# Patient Record
Sex: FEMALE | Race: BLACK OR AFRICAN AMERICAN | Employment: UNEMPLOYED | URBAN - METROPOLITAN AREA
[De-identification: names, ages, dates, MRNs, and addresses within clinical notes are randomized per-mention and may not be internally consistent; named-entity substitution may affect disease eponyms.]

---

## 2021-12-04 ENCOUNTER — OFFICE VISIT (OUTPATIENT)
Dept: URGENT CARE | Age: 56
End: 2021-12-04
Payer: MEDICAID

## 2021-12-04 VITALS — OXYGEN SATURATION: 95 % | HEART RATE: 93 BPM | RESPIRATION RATE: 18 BRPM | TEMPERATURE: 98.3 F

## 2021-12-04 DIAGNOSIS — R09.81 NASAL CONGESTION: ICD-10-CM

## 2021-12-04 DIAGNOSIS — J02.9 SORE THROAT: Primary | ICD-10-CM

## 2021-12-04 DIAGNOSIS — R05.9 COUGH: ICD-10-CM

## 2021-12-04 DIAGNOSIS — R68.83 CHILLS: ICD-10-CM

## 2021-12-04 LAB
FLUAV+FLUBV AG NOSE QL IA.RAPID: NEGATIVE
FLUAV+FLUBV AG NOSE QL IA.RAPID: NEGATIVE
S PYO AG THROAT QL: NEGATIVE
SARS-COV-2 POC: NEGATIVE
VALID INTERNAL CONTROL?: YES
VALID INTERNAL CONTROL?: YES

## 2021-12-04 PROCEDURE — 87880 STREP A ASSAY W/OPTIC: CPT | Performed by: INTERNAL MEDICINE

## 2021-12-04 PROCEDURE — 87426 SARSCOV CORONAVIRUS AG IA: CPT | Performed by: INTERNAL MEDICINE

## 2021-12-04 PROCEDURE — 99203 OFFICE O/P NEW LOW 30 MIN: CPT | Performed by: INTERNAL MEDICINE

## 2021-12-04 PROCEDURE — 87804 INFLUENZA ASSAY W/OPTIC: CPT | Performed by: INTERNAL MEDICINE

## 2021-12-04 RX ORDER — DIPHENHYDRAMINE HCL 25 MG
CAPSULE ORAL
Qty: 30 TABLET | Refills: 0 | Status: SHIPPED | OUTPATIENT
Start: 2021-12-04

## 2021-12-04 RX ORDER — PREDNISONE 20 MG/1
40 TABLET ORAL DAILY
Qty: 6 TABLET | Refills: 0 | Status: SHIPPED | OUTPATIENT
Start: 2021-12-04

## 2021-12-04 RX ORDER — IBUPROFEN 600 MG/1
600 TABLET ORAL
Qty: 20 TABLET | Refills: 0 | Status: SHIPPED | OUTPATIENT
Start: 2021-12-04

## 2021-12-04 RX ORDER — PROMETHAZINE HYDROCHLORIDE AND DEXTROMETHORPHAN HYDROBROMIDE 6.25; 15 MG/5ML; MG/5ML
5 SYRUP ORAL
Qty: 60 ML | Refills: 0 | Status: SHIPPED | OUTPATIENT
Start: 2021-12-04

## 2021-12-04 RX ORDER — LORATADINE 10 MG/1
10 TABLET ORAL DAILY
Qty: 15 TABLET | Refills: 0 | Status: SHIPPED | OUTPATIENT
Start: 2021-12-04

## 2021-12-04 NOTE — LETTER
December 4, 2021  [address] [facility] [address]    Dear Asad Edouard: Thank you for requesting access to Thumbs Up. Please follow the instructions below to securely access and download your online medical record. Thumbs Up allows you to send messages to your doctor, view your test results, renew your prescriptions, schedule appointments, and more. How Do I Sign Up? 1. In your internet browser, go to [email or web address]. 2. Click on the First Time User? Click Here link in the Sign In box. You will see the New Member Sign Up page. 3. Enter your Thumbs Up Access Code exactly as it appears below. You will not need to use this code after youve completed the sign-up process. If you do not sign up before the expiration date, you must request a new code. Thumbs Up Access Code: [email or web address]  Expires: 1/18/2022 11:06 AM     4. Enter the last four digits of your Social Security Number (xxxx) and Date of Birth (mm/dd/yyyy) as indicated and click Submit. You will be taken to the next sign-up page. 5. Create a Thumbs Up ID. This will be your Thumbs Up login ID and cannot be changed, so think of one that is secure and easy to remember. 6. Create a Thumbs Up password. You can change your password at any time. 7. Enter your Password Reset Question and Answer. This can be used at a later time if you forget your password. 8. Enter your e-mail address. You will receive e-mail notification when new information is available in [address]. 9. Click Sign Up. You can now view and download portions of your medical record. 10. Click the Download Summary menu link to download a portable copy of your medical information. Additional Information    If you have questions, please visit the Frequently Asked Questions section of the Thumbs Up website at [email or web address] Remember, Thumbs Up is NOT to be used for urgent needs. For medical emergencies, dial 911.     Now available from your iPhone and Android!     Sincerely,   [employer]

## 2021-12-04 NOTE — PROGRESS NOTES
HISTORY OF PRESENT ILLNESS  Nicki Kohler is a 61 y.o. female. Pt presents today concerned for COVID or FLU infection due to starting to feel bad on Wednesday after working a shift on Monday 11/29. Having congestion, ST, malaise, cough, and HA. Denies fever, CP, SOB, loss of sense of taste and smell, & diarrhea. Sent to get documentation by work (Liliana Rinaldi). There is no problem list on file for this patient. There are no problems to display for this patient. Current Outpatient Medications   Medication Sig Dispense Refill    acetaminophen (TYLENOL PO) Take  by mouth.  ascorbic acid/multivit-min (EMERGEN-C PO) Take  by mouth. No Known Allergies  History reviewed. No pertinent past medical history. History reviewed. No pertinent surgical history. History reviewed. No pertinent family history. Social History     Tobacco Use    Smoking status: Current Every Day Smoker    Smokeless tobacco: Never Used   Substance Use Topics    Alcohol use: Not Currently        Review of Systems   Constitutional: Positive for fever and malaise/fatigue. HENT: Positive for congestion and sore throat. Negative for sinus pain. Respiratory: Positive for cough. Negative for sputum production, shortness of breath and wheezing. Cardiovascular: Negative for chest pain. Gastrointestinal: Negative for abdominal pain, nausea and vomiting. Musculoskeletal: Positive for myalgias. Neurological: Positive for headaches. Negative for dizziness. All other systems reviewed and are negative. Physical Exam  Vitals and nursing note reviewed. Constitutional:       General: She is not in acute distress. Appearance: She is well-developed. She is ill-appearing. She is not diaphoretic. HENT:      Head: Normocephalic and atraumatic. Right Ear: External ear normal.      Left Ear: External ear normal.      Mouth/Throat:      Pharynx: Posterior oropharyngeal erythema present.       Tonsils: Tonsillar exudate present. 1+ on the right. 1+ on the left. Cardiovascular:      Rate and Rhythm: Normal rate. Pulmonary:      Effort: Pulmonary effort is normal. No respiratory distress. Breath sounds: Normal breath sounds. No decreased breath sounds, wheezing or rhonchi. Abdominal:      General: There is no distension. Neurological:      Mental Status: She is alert and oriented to person, place, and time. Psychiatric:         Behavior: Behavior normal.         Judgment: Judgment normal.         ASSESSMENT and PLAN  CLINICAL IMPRESSION:     ICD-10-CM ICD-9-CM    1. Sore throat  J02.9 462 AMB POC SARS-COV-2      AMB POC JA INFLUENZA A/B TEST      AMB POC RAPID STREP A      UPPER RESPIRATORY CULTURE      UPPER RESPIRATORY CULTURE   2. Chills  R68.83 780.64 AMB POC SARS-COV-2      AMB POC JA INFLUENZA A/B TEST      AMB POC RAPID STREP A   3. Nasal congestion  R09.81 478.19 AMB POC SARS-COV-2      AMB POC JA INFLUENZA A/B TEST   4. Cough  R05.9 786.2 AMB POC SARS-COV-2      AMB POC JA INFLUENZA A/B TEST      AMB POC RAPID STREP A         Plan:  F/U with PCP, INI or symptoms worsen. May report to ER for SOB or CP. Advised to self-isolate for 10 days following potential exposure and at least 24 hours following fever. Symptomatic treatment advised otherwise with use of OTC/Rx meds. Results for orders placed or performed in visit on 12/04/21   AMB POC SARS-COV-2   Result Value Ref Range    SARS-COV-2 POC Negative Negative   AMB POC JA INFLUENZA A/B TEST   Result Value Ref Range    VALID INTERNAL CONTROL POC Yes     Influenza A Ag POC Negative Negative    Influenza B Ag POC Negative Negative   AMB POC RAPID STREP A   Result Value Ref Range    VALID INTERNAL CONTROL POC Yes     Group A Strep Ag Negative Negative             The patients condition was discussed with the patient and they understand. The patient is to follow up with PCP.  If signs and symptoms become worse the pt is to go to the ER. The patient is to take medications as prescribed.

## 2021-12-04 NOTE — PROGRESS NOTES
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     She was examined again  No pharyngeal erythema/ exudate seen  Viral nature of sxs explained   Rx sent and advised to use oral steroid if pain not better    Patient was reassured that she would feel better with Rx given and OTC rx -   If she cant go back to work Monday due to her worsening of her ss- she need to be seen again for work note extension

## 2021-12-04 NOTE — LETTER
NOTIFICATION RETURN TO WORK / SCHOOL    12/4/2021 1:32 PM    Ms. Nicki Kohler  [address] [id number]      To Whom It May Concern:    Nicki Kohler is currently under the care of [address]. She will return to work/school on: 12/6/21, FLU, STREP, & COVID Negative, tested TODAY (12/4/21). If there are questions or concerns please have the patient contact our office.         Sincerely,      E PROVIDER

## 2021-12-04 NOTE — PATIENT INSTRUCTIONS
Follow Up with PCP in 3-5 days if no improvement/routine care. Call to be seen sooner or return Here/ER, if no improvement with treatments advised/prescribed or symptoms/pain worsen (develop fever, pain worsens significantly, or develop NEW symptoms). May use  Benadryl, Phenylephrine, or Chlor-Trimetron for continued cough and sore throat. Claritin, allegra, Xyzal, or Zyrtec may also help. Also try Breathe-Rite (or similar) nose strips for nasal congestion and difficulty sleeping. Also try SEA FLEMING (gel). Try 2 teaspoons of 100% pure honey at bedtime to help with nighttime coughing/sore throat. Vitamin C (7521-7325 mg) may also be helpful for your symptoms. *Frequent handwashing*, rest, and plenty of fluids are most important. Upper Respiratory Infection (Cold): Care Instructions  Your Care Instructions     An upper respiratory infection, or URI, is an infection of the nose, sinuses, or throat. URIs are spread by coughs, sneezes, and direct contact. The common cold is the most frequent kind of URI. The flu and sinus infections are other kinds of URIs. Almost all URIs are caused by viruses. Antibiotics won't cure them. But you can treat most infections with home care. This may include drinking lots of fluids and taking over-the-counter pain medicine. You will probably feel better in 4 to 10 days. The doctor has checked you carefully, but problems can develop later. If you notice any problems or new symptoms, get medical treatment right away. Follow-up care is a key part of your treatment and safety. Be sure to make and go to all appointments, and call your doctor if you are having problems. It's also a good idea to know your test results and keep a list of the medicines you take. How can you care for yourself at home? · To prevent dehydration, drink plenty of fluids. Choose water and other clear liquids until you feel better.  If you have kidney, heart, or liver disease and have to limit fluids, talk with your doctor before you increase the amount of fluids you drink. · Take an over-the-counter pain medicine, such as acetaminophen (Tylenol), ibuprofen (Advil, Motrin), or naproxen (Aleve). Read and follow all instructions on the label. · Before you use cough and cold medicines, check the label. These medicines may not be safe for young children or for people with certain health problems. · Be careful when taking over-the-counter cold or flu medicines and Tylenol at the same time. Many of these medicines have acetaminophen, which is Tylenol. Read the labels to make sure that you are not taking more than the recommended dose. Too much acetaminophen (Tylenol) can be harmful. · Get plenty of rest.  · Do not smoke or allow others to smoke around you. If you need help quitting, talk to your doctor about stop-smoking programs and medicines. These can increase your chances of quitting for good. When should you call for help? Call 911 anytime you think you may need emergency care. For example, call if:    · You have severe trouble breathing. Call your doctor now or seek immediate medical care if:    · You seem to be getting much sicker.     · You have new or worse trouble breathing.     · You have a new or higher fever.     · You have a new rash. Watch closely for changes in your health, and be sure to contact your doctor if:    · You have a new symptom, such as a sore throat, an earache, or sinus pain.     · You cough more deeply or more often, especially if you notice more mucus or a change in the color of your mucus.     · You do not get better as expected. Where can you learn more? Go to http://www.gray.com/  Enter K520 in the search box to learn more about \"Upper Respiratory Infection (Cold): Care Instructions. \"  Current as of: July 6, 2021               Content Version: 13.0  © 9242-3333 Healthwise, [employer].    Care instructions adapted under license by [facility] (which disclaims liability or warranty for this information). If you have questions about a medical condition or this instruction, always ask your healthcare professional. [address] any warranty or liability for your use of this information. Upper Respiratory Infection (Cold): Care Instructions  Your Care Instructions     An upper respiratory infection, or URI, is an infection of the nose, sinuses, or throat. URIs are spread by coughs, sneezes, and direct contact. The common cold is the most frequent kind of URI. The flu and sinus infections are other kinds of URIs. Almost all URIs are caused by viruses. Antibiotics won't cure them. But you can treat most infections with home care. This may include drinking lots of fluids and taking over-the-counter pain medicine. You will probably feel better in 4 to 10 days. The doctor has checked you carefully, but problems can develop later. If you notice any problems or new symptoms, get medical treatment right away. Follow-up care is a key part of your treatment and safety. Be sure to make and go to all appointments, and call your doctor if you are having problems. It's also a good idea to know your test results and keep a list of the medicines you take. How can you care for yourself at home? · To prevent dehydration, drink plenty of fluids. Choose water and other clear liquids until you feel better. If you have kidney, heart, or liver disease and have to limit fluids, talk with your doctor before you increase the amount of fluids you drink. · Take an over-the-counter pain medicine, such as acetaminophen (Tylenol), ibuprofen (Advil, Motrin), or naproxen (Aleve). Read and follow all instructions on the label. · Before you use cough and cold medicines, check the label. These medicines may not be safe for young children or for people with certain health problems.   · Be careful when taking over-the-counter cold or flu medicines and Tylenol at the same time. Many of these medicines have acetaminophen, which is Tylenol. Read the labels to make sure that you are not taking more than the recommended dose. Too much acetaminophen (Tylenol) can be harmful. · Get plenty of rest.  · Do not smoke or allow others to smoke around you. If you need help quitting, talk to your doctor about stop-smoking programs and medicines. These can increase your chances of quitting for good. When should you call for help? Call 911 anytime you think you may need emergency care. For example, call if:    · You have severe trouble breathing. Call your doctor now or seek immediate medical care if:    · You seem to be getting much sicker.     · You have new or worse trouble breathing.     · You have a new or higher fever.     · You have a new rash. Watch closely for changes in your health, and be sure to contact your doctor if:    · You have a new symptom, such as a sore throat, an earache, or sinus pain.     · You cough more deeply or more often, especially if you notice more mucus or a change in the color of your mucus.     · You do not get better as expected. Where can you learn more? Go to http://www.gray.com/  Enter K520 in the search box to learn more about \"Upper Respiratory Infection (Cold): Care Instructions. \"  Current as of: July 6, 2021               Content Version: 13.0  © 2575-1146 [employer]. Care instructions adapted under license by [employer] (which disclaims liability or warranty for this information). If you have questions about a medical condition or this instruction, always ask your healthcare professional. [place] [address] any warranty or liability for your use of this information. Sore Throat: Care Instructions  Your Care Instructions     Infection by bacteria or a virus causes most sore throats.  Cigarette smoke, dry air, air pollution, allergies, and yelling can also cause a sore throat. Sore throats can be painful and annoying. Fortunately, most sore throats go away on their own. If you have a bacterial infection, your doctor may prescribe antibiotics. Follow-up care is a key part of your treatment and safety. Be sure to make and go to all appointments, and call your doctor if you are having problems. It's also a good idea to know your test results and keep a list of the medicines you take. How can you care for yourself at home? · If your doctor prescribed antibiotics, take them as directed. Do not stop taking them just because you feel better. You need to take the full course of antibiotics. · Gargle with warm salt water once an hour to help reduce swelling and relieve discomfort. Use 1 teaspoon of salt mixed in 1 cup of warm water. · Take an over-the-counter pain medicine, such as acetaminophen (Tylenol), ibuprofen (Advil, Motrin), or naproxen (Aleve). Read and follow all instructions on the label. · Be careful when taking over-the-counter cold or flu medicines and Tylenol at the same time. Many of these medicines have acetaminophen, which is Tylenol. Read the labels to make sure that you are not taking more than the recommended dose. Too much acetaminophen (Tylenol) can be harmful. · Drink plenty of fluids. Fluids may help soothe an irritated throat. Hot fluids, such as tea or soup, may help decrease throat pain. · Use over-the-counter throat lozenges to soothe pain. Regular cough drops or hard candy may also help. These should not be given to young children because of the risk of choking. · Do not smoke or allow others to smoke around you. If you need help quitting, talk to your doctor about stop-smoking programs and medicines. These can increase your chances of quitting for good. · Use a vaporizer or humidifier to add moisture to your bedroom. Follow the directions for cleaning the machine. When should you call for help?    Call your doctor now or seek immediate medical care if:    · You have new or worse trouble swallowing.     · Your sore throat gets much worse on one side. Watch closely for changes in your health, and be sure to contact your doctor if you do not get better as expected. Where can you learn more? Go to http://www.gray.com/  Enter U420 in the search box to learn more about \"Sore Throat: Care Instructions. \"  Current as of: December 2, 2020               Content Version: 13.0  © 2006-2021 [employer]. Care instructions adapted under license by [employer] (which disclaims liability or warranty for this information). If you have questions about a medical condition or this instruction, always ask your healthcare professional. [address] any warranty or liability for your use of this information.

## 2021-12-09 LAB
BACTERIA SPEC RESP CULT: ABNORMAL

## 2021-12-09 RX ORDER — CLOTRIMAZOLE 10 MG/1
10 LOZENGE ORAL; TOPICAL
Qty: 50 TABLET | Refills: 0 | Status: SHIPPED | OUTPATIENT
Start: 2021-12-09 | End: 2021-12-19

## 2021-12-09 RX ORDER — CEPHALEXIN 500 MG/1
500 CAPSULE ORAL 2 TIMES DAILY
Qty: 20 CAPSULE | Refills: 0 | Status: SHIPPED | OUTPATIENT
Start: 2021-12-09 | End: 2021-12-19

## 2022-08-03 ENCOUNTER — HOSPITAL ENCOUNTER (EMERGENCY)
Age: 57
Discharge: HOME | End: 2022-08-03
Attending: EMERGENCY MEDICINE
Payer: MEDICAID

## 2022-08-03 VITALS
OXYGEN SATURATION: 97 % | DIASTOLIC BLOOD PRESSURE: 109 MMHG | HEIGHT: 68 IN | BODY MASS INDEX: 30.17 KG/M2 | HEART RATE: 90 BPM | TEMPERATURE: 98.8 F | RESPIRATION RATE: 16 BRPM | WEIGHT: 199.08 LBS | SYSTOLIC BLOOD PRESSURE: 143 MMHG

## 2022-08-03 DIAGNOSIS — R21: Primary | ICD-10-CM

## 2022-08-03 DIAGNOSIS — B20: Primary | ICD-10-CM

## 2022-08-03 DIAGNOSIS — R52 INTRACTABLE PAIN: ICD-10-CM

## 2022-08-03 LAB — RPR SER QL: NONREACTIVE

## 2022-08-03 PROCEDURE — 87255 GENET VIRUS ISOLATE HSV: CPT

## 2022-08-03 PROCEDURE — 74011250637 HC RX REV CODE- 250/637: Performed by: PHYSICIAN ASSISTANT

## 2022-08-03 PROCEDURE — 86592 SYPHILIS TEST NON-TREP QUAL: CPT

## 2022-08-03 PROCEDURE — 99283 EMERGENCY DEPT VISIT LOW MDM: CPT

## 2022-08-03 PROCEDURE — 87205 SMEAR GRAM STAIN: CPT

## 2022-08-03 RX ORDER — LORAZEPAM 0.5 MG/1
0.5 TABLET ORAL
Qty: 4 TABLET | Refills: 0 | Status: SHIPPED | OUTPATIENT
Start: 2022-08-03

## 2022-08-03 RX ORDER — GABAPENTIN 100 MG/1
CAPSULE ORAL
Qty: 52 CAPSULE | Refills: 0 | Status: SHIPPED | OUTPATIENT
Start: 2022-08-03 | End: 2022-08-12

## 2022-08-03 RX ORDER — OXYCODONE AND ACETAMINOPHEN 5; 325 MG/1; MG/1
1 TABLET ORAL
Status: COMPLETED | OUTPATIENT
Start: 2022-08-03 | End: 2022-08-03

## 2022-08-03 RX ORDER — ACYCLOVIR 800 MG/1
800 TABLET ORAL
Qty: 35 TABLET | Refills: 0 | Status: SHIPPED | OUTPATIENT
Start: 2022-08-03 | End: 2022-08-10

## 2022-08-03 RX ORDER — MUPIROCIN 20 MG/G
OINTMENT TOPICAL DAILY
Qty: 22 G | Refills: 0 | Status: SHIPPED | OUTPATIENT
Start: 2022-08-03

## 2022-08-03 RX ADMIN — OXYCODONE HYDROCHLORIDE AND ACETAMINOPHEN 1 TABLET: 5; 325 TABLET ORAL at 16:37

## 2022-08-03 NOTE — Clinical Note
[facility] EMERGENCY DEPT  [address] [facility] [address] [phone]    Work/School Note    Date: 8/3/2022    To Whom It May concern:    Ronnell Steen was seen and treated today in the emergency room by the following provider(s):  Attending Provider: Damion Soler MD  Physician Assistant: REBECA Tubbs. Ronnell Steen is excused from work/school on 8/3/2022 through 8/5/2022. She is medically clear to return to work/school on 8/6/2022.          Sincerely,          REBECA Choi

## 2022-08-03 NOTE — ED TRIAGE NOTES
\"I just left my infection disease doctor. I have some oozing and lesions in between my buttocks.  They told me to come here\"

## 2022-08-03 NOTE — ED NOTES
Pt given dc instructions and verbalized understanding. Pt has 4 prescriptions sent over to her preferred pharmacy. Pt ambulated out of ER w/o difficulty.

## 2022-08-03 NOTE — Clinical Note
[address] EMERGENCY DEPT  [address] [facility] [address] [phone]    Work/School Note    Date: 8/3/2022    To Whom It May concern:    Isabel Be was seen and treated today in the emergency room by the following provider(s):  Attending Provider: Manny Rivas MD  Physician Assistant: REBECA Finch. Isabel Be is excused from work/school on 8/3/2022 through 8/5/2022. She is medically clear to return to work/school on 8/6/2022.          Sincerely,          Fuentes Worthington RN

## 2022-08-03 NOTE — ED PROVIDER NOTES
EMERGENCY DEPARTMENT HISTORY AND PHYSICAL EXAM      Date: 8/3/2022  Patient Name: Nan Segovia    History of Presenting Illness     Chief Complaint   Patient presents with    Skin Problem       History Provided By: Patient and chart review    HPI: Nan Segovia, 61 y.o. female with a history of HIV and recent AIDS diagnosis who presents emergency department with a rash. For the past 2 weeks she has had a painful rash on her upper extremities and anogenital region. She has been seen multiple times in the emergency department where she was prescribed a hydrocortisone cream which did not relieve her symptoms. She was told today by her ID doctor to come to the emergency department for further evaluation. She states the lesions appear and are very painful. There is some oozing but then scab over and disappear. She has had a few on her upper extremities but are located primarily around her rectal area. She states she was diagnosed with HIV in 2004 but has not been on antiviral therapy for an unknown amount of years after a bad experience with her doctor at the time. She denies any other medical problems or any known diagnosis of a severe infection. She denies any other symptoms, to include fevers, chest pain, shortness of breath, nausea, vomiting, diarrhea, dysuria, hematuria, abnormal vaginal discharge or bleeding, headache or dizziness. There are no other complaints, changes, or physical findings at this time. PCP: Other, None, [place]    No current facility-administered medications on file prior to encounter. Current Outpatient Medications on File Prior to Encounter   Medication Sig Dispense Refill    ascorbic acid/multivit-min (EMERGEN-C PO) Take  by mouth.      loratadine (Claritin) 10 mg tablet Take 1 Tablet by mouth daily.  15 Tablet 0    qfonicdqm-kk-eynebgeb-guaifen (Vicks DayQuil Severe Cold-Flu) 5--200 mg tab 1 tab 4 times/ day 30 Tablet 0    ibuprofen (MOTRIN) 600 mg tablet Take 1 Tablet by mouth every six (6) hours as needed for Pain. 20 Tablet 0    predniSONE (DELTASONE) 20 mg tablet Take 40 mg by mouth daily. With food 6 Tablet 0    promethazine-dextromethorphan (PROMETHAZINE-DM) 6.25-15 mg/5 mL syrup Take 5 mL by mouth nightly as needed for Cough. 60 mL 0       Past History     Past Medical History:  No past medical history on file. Past Surgical History:  No past surgical history on file. Family History:  No family history on file. Social History:  Social History     Tobacco Use    Smoking status: Every Day    Smokeless tobacco: Never   Substance Use Topics    Alcohol use: Not Currently       Allergies:  No Known Allergies      Review of Systems   Review of Systems   Constitutional:  Negative for chills and fever. HENT:  Negative for congestion and sore throat. Respiratory:  Negative for cough and shortness of breath. Cardiovascular:  Negative for chest pain. Gastrointestinal:  Negative for abdominal pain, diarrhea, nausea and vomiting. Genitourinary:  Negative for dysuria and hematuria. Musculoskeletal:  Negative for myalgias. Skin:  Positive for rash. Neurological:  Negative for headaches. All other systems reviewed and are negative. Physical Exam   Physical Exam  Vitals and nursing note reviewed. Exam conducted with a chaperone present. Constitutional:       General: She is not in acute distress. Appearance: She is not toxic-appearing. Comments: Patient resting comfortably in bed, nontoxic. No acute distress. She becomes tearful when discussing her medical history. HENT:      Head: Atraumatic. Right Ear: External ear normal.      Left Ear: External ear normal.      Nose: Nose normal.      Mouth/Throat:      Mouth: Mucous membranes are moist.      Comments: Mucous membranes normal to inspection with no lesions, swelling or exudates. Eyes:      Extraocular Movements: Extraocular movements intact.       Conjunctiva/sclera: Conjunctivae normal.   Cardiovascular:      Rate and Rhythm: Normal rate and regular rhythm. Pulses: Normal pulses. Heart sounds: Normal heart sounds. No murmur heard. No friction rub. No gallop. Pulmonary:      Effort: Pulmonary effort is normal. No respiratory distress. Breath sounds: Normal breath sounds. No stridor. No wheezing, rhonchi or rales. Abdominal:      General: Abdomen is flat. There is no distension. Palpations: Abdomen is soft. Tenderness: There is no abdominal tenderness. There is no right CVA tenderness, left CVA tenderness, guarding or rebound. Genitourinary:     Rectum: Normal.      Comments: + Small, nonthrombosed external hemorrhoid  Musculoskeletal:         General: No swelling. Cervical back: Neck supple. Skin:     General: Skin is warm. Findings: Rash present. Comments: Purpuric nonblanchable  nontender rash extending over bilateral gluteus with scattered superficial ulcerated tender lesions in various stages of healing. No significant purulent drainage or bleeding. No concerning findings for cellulitis. No crepitus or pain out of proportion. Perineum normal to inspection with no rash swelling or erythema   Neurological:      Mental Status: She is alert and oriented to person, place, and time. Psychiatric:         Mood and Affect: Mood normal.         Behavior: Behavior normal.         Thought Content: Thought content normal.         Judgment: Judgment normal.             Diagnostic Study Results     Labs -   No results found for this or any previous visit (from the past 12 hour(s)). Radiologic Studies -   No orders to display     CT Results  (Last 48 hours)      None          CXR Results  (Last 48 hours)      None              Medical Decision Making   I am the first provider for this patient.     I reviewed the vital signs, available nursing notes, past medical history, past surgical history, family history and social history. Vital Signs-Reviewed the patient's vital signs. Patient Vitals for the past 12 hrs:   Temp Pulse Resp BP SpO2   08/03/22 1524 98.8 °F (37.1 °C) 90 16 (!) 143/109 97 %       Records Reviewed: Nursing Notes and Old Medical Records    Provider Notes (Medical Decision Making):   Patient evaluated for 2-week history of painful rash on her upper extremities and gluteal region in the setting of HIV with AIDS defining viral load and CD4. Patient not having any systemic symptoms and was seen yesterday at [facility] where she had blood work drawn which was unremarkable at the time. She is well-appearing today with unremarkable vital signs. Aside from her rash, I have low concern for a systemic or other localized infection involving other organ system. Review of the chart, she does have a previous history of herpes, and the painful ulcerated lesions are consistent with a likely HSV lesion. The hyperpigmentation may represent Kaposi's sarcoma, though this cannot be confirmed in the ED. Wound culture, HSV culture and RPR are pending at time of discharge. Patient was treated empirically for an HSV infection and prescribed gabapentin. She was advised on follow-up with her infectious disease doctor and return precautions to the ED. Patient expressed understanding agreement the discharge instructions and treatment plan. ED Course:   Initial assessment performed. The patients presenting problems have been discussed, and they are in agreement with the care plan formulated and outlined with them. I have encouraged them to ask questions as they arise throughout their visit. ED Course as of 08/03/22 1757   Wed Aug 03, 2022   1624 Chart reviewed from patient visit at [facility] on 8/2 her CBC and CMP were performed. No significant abnormalities. Patient denies any acute changes no infectious symptoms and is afebrile in the ED. Will defer labs at this time.  [AJ]      ED Course User Index  [AJ] [facility] Time: None    Disposition:  discharged    PLAN:  1. Current Discharge Medication List        2. Follow-up Information    None       Return to ED if worse     Diagnosis     Clinical Impression: No diagnosis found. Please note that this dictation was completed with Elecar, the computer voice recognition software. Quite often unanticipated grammatical, syntax, homophones, and other interpretive errors are inadvertently transcribed by the computer software. Please disregards these errors. Please excuse any errors that have escaped final proofreading.

## 2022-08-03 NOTE — DISCHARGE INSTRUCTIONS
As we discussed, if you test positive for HSV herpes, syphilis or found to have an infection in your wounds that requires treatment with an antibiotic, you will receive a call in a few days. Call to make the next available appoint with infectious disease Dr. [facility] for reevaluation. You may return the emergency department for any new concerning symptoms.

## 2022-08-05 LAB
BACTERIA SPEC CULT: NORMAL
GRAM STN SPEC: NORMAL
GRAM STN SPEC: NORMAL
HSV SPEC CULT: NORMAL
SERVICE CMNT-IMP: NORMAL
SPECIMEN SOURCE: NORMAL

## 2023-01-09 ENCOUNTER — APPOINTMENT (OUTPATIENT)
Dept: GENERAL RADIOLOGY | Age: 58
End: 2023-01-09
Attending: NURSE PRACTITIONER
Payer: MEDICAID

## 2023-01-09 ENCOUNTER — HOSPITAL ENCOUNTER (EMERGENCY)
Age: 58
Discharge: HOME | End: 2023-01-09
Attending: STUDENT IN AN ORGANIZED HEALTH CARE EDUCATION/TRAINING PROGRAM
Payer: MEDICAID

## 2023-01-09 VITALS
HEART RATE: 80 BPM | OXYGEN SATURATION: 100 % | BODY MASS INDEX: 33.19 KG/M2 | DIASTOLIC BLOOD PRESSURE: 102 MMHG | WEIGHT: 219 LBS | SYSTOLIC BLOOD PRESSURE: 145 MMHG | RESPIRATION RATE: 16 BRPM | HEIGHT: 68 IN | TEMPERATURE: 98.4 F

## 2023-01-09 DIAGNOSIS — S16.1XXA STRAIN OF NECK MUSCLE, INITIAL ENCOUNTER: Primary | ICD-10-CM

## 2023-01-09 PROCEDURE — 96372 THER/PROPH/DIAG INJ SC/IM: CPT

## 2023-01-09 PROCEDURE — 74011250637 HC RX REV CODE- 250/637: Performed by: NURSE PRACTITIONER

## 2023-01-09 PROCEDURE — 74011250636 HC RX REV CODE- 250/636: Performed by: NURSE PRACTITIONER

## 2023-01-09 PROCEDURE — 99284 EMERGENCY DEPT VISIT MOD MDM: CPT

## 2023-01-09 PROCEDURE — 72050 X-RAY EXAM NECK SPINE 4/5VWS: CPT

## 2023-01-09 RX ORDER — KETOROLAC TROMETHAMINE 30 MG/ML
30 INJECTION, SOLUTION INTRAMUSCULAR; INTRAVENOUS
Status: COMPLETED | OUTPATIENT
Start: 2023-01-09 | End: 2023-01-09

## 2023-01-09 RX ORDER — HYDROCODONE BITARTRATE AND ACETAMINOPHEN 5; 325 MG/1; MG/1
1 TABLET ORAL
Status: COMPLETED | OUTPATIENT
Start: 2023-01-09 | End: 2023-01-09

## 2023-01-09 RX ORDER — CYCLOBENZAPRINE HCL 10 MG
10 TABLET ORAL
Status: COMPLETED | OUTPATIENT
Start: 2023-01-09 | End: 2023-01-09

## 2023-01-09 RX ORDER — DIAZEPAM 5 MG/1
5 TABLET ORAL
Qty: 6 TABLET | Refills: 0 | Status: SHIPPED | OUTPATIENT
Start: 2023-01-09

## 2023-01-09 RX ADMIN — CYCLOBENZAPRINE 10 MG: 10 TABLET, FILM COATED ORAL at 16:54

## 2023-01-09 RX ADMIN — HYDROCODONE BITARTRATE AND ACETAMINOPHEN 1 TABLET: 5; 325 TABLET ORAL at 18:49

## 2023-01-09 RX ADMIN — KETOROLAC TROMETHAMINE 30 MG: 30 INJECTION, SOLUTION INTRAMUSCULAR; INTRAVENOUS at 16:53

## 2023-01-09 NOTE — ED TRIAGE NOTES
Pt arrived ambulatory to the ED complaining of right sided neck spasms x 5 days. Pt states she was previously seen in the ED for the symptoms and given Robaxin, Prednisone, and lidocaine patches. Pt states she is still having the muscle spasms in her neck.

## 2023-01-09 NOTE — Clinical Note
[facility] EMERGENCY DEPT  [address] [address] [phone]    Work/School Note    Date: 1/9/2023    To Whom It May concern:    Mani Price was seen and treated today in the emergency room by the following provider(s):  Attending Provider: Lily Serrano MD  Nurse Practitioner: Courtney Nguyen NP. Mani Price is excused from work/school on 1/9/2023 through 1/11/2023. She is medically clear to return to work/school on 1/12/2023.          Sincerely,          Ramila Woodward NP

## 2023-01-09 NOTE — DISCHARGE INSTRUCTIONS
Continue with the prednisone. Please stop the robaxin while taking the valium  Valium is only as needed for muscle spasms. Valium will make you drowsy. Do not drive or operate heavy machinery          It was a pleasure taking care of you at [facility] Emergency Department today. We know that when you come to [place] [facility], you are entrusting us with your health, comfort, and safety. Our physicians and nurses honor that trust, and we truly appreciate the opportunity to care for you and your loved ones. We also value our feedback. If you receive a survey about your Emergency Department experience today, please fill it out. We care about our patients' feedback, and we listen to what you have to say. Thank you!

## 2023-01-09 NOTE — ED NOTES
Discharge instructions were given to the patient by Xavier Dudley RN. The patient left the Emergency Department ambulatory, alert and oriented and in no acute distress with one prescriptions. The patient was encouraged to call or return to the ED for worsening issues or problems and was encouraged to schedule a follow up appointment for continuing care. The patient verbalized understanding of discharge instructions and prescriptions, all questions were answered. The patient has no further concerns at this time.

## 2023-01-09 NOTE — ED PROVIDER NOTES
[facility] EMERGENCY DEPT  EMERGENCY DEPARTMENT ENCOUNTER       Pt Name: Domingo Villatoro  MRN: [id number]  [place] 1965  Date of evaluation: 1/9/2023  Provider: Ashish Neal NP   PCP: Kirstin Johnston MD  Note Started: 4:22 PM 1/9/23     ED attending involment: I have seen and evaluated the patient. My supervision physician was available for consultation. CHIEF COMPLAINT       Chief Complaint   Patient presents with    Neck Pain               HISTORY OF PRESENT ILLNESS: 1 or more elements      History From: Patient  HPI Limitations : None     Domingo Villatoro is a 61 y.o. female who presents for neck pain. Onset 5 days ago. Located to the R side of neck. She reports being seen at [facility] and was given robaxin, prednisone and lidocaine patches. She reports mild relief after taking medication but then it returns. She reports pain with turning head. Denies headache, jaw pain, chest pain, numbness, tingling or extremity weakness. Denies hx of arthritis. She denies injury to neck or receiving imaging studies. Nursing Notes were all reviewed and agreed with or any disagreements were addressed in the HPI. REVIEW OF SYSTEMS      Review of Systems   Constitutional:  Negative for chills and fever. Musculoskeletal:  Positive for neck pain and neck stiffness. Negative for arthralgias, back pain, gait problem and joint swelling. Skin:  Negative for rash and wound. Neurological:  Negative for dizziness, weakness, numbness and headaches. All other systems reviewed and are negative. Positives and Pertinent negatives as per HPI. PAST HISTORY     Past Medical History:  History reviewed. No pertinent past medical history. Past Surgical History:  History reviewed. No pertinent surgical history. Family History:  History reviewed. No pertinent family history.     Social History:  Social History     Tobacco Use    Smoking status: Every Day    Smokeless tobacco: Never   Substance Use Topics    Alcohol use: Not Currently    Drug use: Never       Allergies:  No Known Allergies    CURRENT MEDICATIONS      Previous Medications    ASCORBIC ACID/MULTIVIT-MIN (EMERGEN-C PO)    Take  by mouth. IBUPROFEN (MOTRIN) 600 MG TABLET    Take 1 Tablet by mouth every six (6) hours as needed for Pain. LIDOCAINE 4 % GEL    2 g by Apply Externally route two (2) times a day. LORATADINE (CLARITIN) 10 MG TABLET    Take 1 Tablet by mouth daily. LORAZEPAM (ATIVAN) 0.5 MG TABLET    Take 1 Tablet by mouth every eight (8) hours as needed for Anxiety. Max Daily Amount: 1.5 mg. MUPIROCIN (BACTROBAN) 2 % OINTMENT    Apply  to affected area daily. SNIDLJIYM-NB-PZLDFADE-GUAIFEN (VICKS DAYQUIL SEVERE COLD-FLU) 5--200 MG TAB    1 tab 4 times/ day    PREDNISONE (DELTASONE) 20 MG TABLET    Take 40 mg by mouth daily. With food    PROMETHAZINE-DEXTROMETHORPHAN (PROMETHAZINE-DM) 6.25-15 MG/5 ML SYRUP    Take 5 mL by mouth nightly as needed for Cough. PHYSICAL EXAM      ED Triage Vitals [01/09/23 1544]   ED Encounter Vitals Group      BP (!) 145/102      Pulse (Heart Rate) 80      Resp Rate 16      Temp 98.4 °F (36.9 °C)      Temp src       O2 Sat (%) 100 %      Weight 219 lb      Height 5' 8\"        Physical Exam  Vitals and nursing note reviewed. Constitutional:       General: She is not in acute distress. Appearance: She is well-developed. She is not ill-appearing. Cardiovascular:      Rate and Rhythm: Normal rate and regular rhythm. Pulses: Normal pulses. Heart sounds: Normal heart sounds. Pulmonary:      Effort: Pulmonary effort is normal.      Breath sounds: Normal breath sounds. Musculoskeletal:      Cervical back: Neck supple. Spasms and tenderness (R anterior paraspinous) present. No swelling, deformity or crepitus. Pain with movement present. Decreased range of motion. Thoracic back: Normal.      Lumbar back: Normal.   Skin:     General: Skin is warm and dry. Neurological:      Mental Status: She is alert and oriented to person, place, and time. DIAGNOSTIC RESULTS   LABS:     No results found for this or any previous visit (from the past 12 hour(s)). RADIOLOGY:  Non-plain film images such as CT, Ultrasound and MRI are read by the radiologist. Plain radiographic images are visualized and preliminarily interpreted by the ED Provider with the below findings:          Interpretation per the Radiologist below, if available at the time of this note:  Impression:    No acute fracture or dislocation. Narrative:    EXAM: XR SPINE CERV 4 OR 5 V   CLINICAL HISTORY: neck pain, radiculopathy   INDICATION: neck pain, radiculopathy   COMPARISON: none   FINDINGS:   4 views of the cervical spine are obtained. The spinal alignment is normal.  Vertebral morphology is normal. Loss of disc   height C4-5 C5-6. Foraminal stenosis at C6-C7. There are no identifiable   paravertebral soft tissue abnormalities. Prevertebral soft tissues unremarkable. Atlanto-dental interval within normal limits. No evidence of subluxation.                      PROCEDURES   Unless otherwise noted below, none  Procedures     EMERGENCY DEPARTMENT COURSE and DIFFERENTIAL DIAGNOSIS/MDM   Vitals:    Vitals:    01/09/23 1544   BP: (!) 145/102   Pulse: 80   Resp: 16   Temp: 98.4 °F (36.9 °C)   SpO2: 100%   Weight: 99.3 kg (219 lb)   Height: 5' 8\" (1.727 m)        Patient was given the following medications:  Medications - No data to display    CONSULTS: (Who and What was discussed)  None    Chronic Conditions: HIV    Social Determinants affecting Dx or Tx: None    Records Reviewed (source and summary): Nursing Notes, Old Medical Records, and Previous Radiology Studies    MDM (CC/HPI Summary, DDx, ED Course, Reassessment, Disposition Considerations -Tests not done, Shared Decision Making, Pt Expectation of Test or Tx.):     62 yo F presenting with c/o neck pain exhibiting tenderness to the R anterior cervical paraspinosu region. Pt has limited ROM on exam with neck rotation. She has severe spasms noted as well. Xray is negative for fracture and dislocation however there is stenosis noted which may be the cause of her neck pain. Advised she needs close follow up with ortho to obtain MRI. She reports relief with valium PO, toradol IM and norco PO during her visit. DDX: arthritis, cervical spinal stenosis, cervical strain, torticollis. Considered  MI but she lacks chest pain, jaw pain and she reports neck pain is more related to movement and she reports a previous experience. No hx of heart disease. FINAL IMPRESSION     1. Strain of neck muscle, initial encounter          DISPOSITION/PLAN           Care plan outlined and precautions discussed. Patient has no new complaints, changes, or physical findings. All of pt's questions and concerns were addressed. Patient was instructed and agrees to follow up with Ortho, as well as to return to the ED upon further deterioration. Patient is ready to go home. PATIENT REFERRED TO:  Follow-up Information       Follow up With Specialties Details Why [address]  Call in 1 week As needed, If symptoms worsen [address] [facility], [address] [place] [address] Drive  [phone]              DISCHARGE MEDICATIONS:  Discharge Medication List as of 1/9/2023  6:40 PM        START taking these medications    Details   diazePAM (Valium) 5 mg tablet Take 1 Tablet by mouth every twelve (12) hours as needed for Anxiety or Muscle Spasm(s) (spasm). Max Daily Amount: 10 mg., Normal, Disp-6 Tablet, R-0           CONTINUE these medications which have NOT CHANGED    Details   mupirocin (BACTROBAN) 2 % ointment Apply  to affected area daily. , Normal, Disp-22 g, R-0      ascorbic acid/multivit-min (EMERGEN-C PO) Take  by mouth., Historical Med      lidocaine 4 % gel 2 g by Apply Externally route two (2) times a day., Normal, Disp-110 g, R-0      loratadine (Claritin) 10 mg tablet Take 1 Tablet by mouth daily. , Normal, Disp-15 Tablet, R-0      gxypdwxfs-iz-xckivhzv-guaifen (Vicks DayQuil Severe Cold-Flu) 5--200 mg tab 1 tab 4 times/ day, Normal, Disp-30 Tablet, R-0      ibuprofen (MOTRIN) 600 mg tablet Take 1 Tablet by mouth every six (6) hours as needed for Pain., Normal, Disp-20 Tablet, R-0      predniSONE (DELTASONE) 20 mg tablet Take 40 mg by mouth daily. With food, Print, Disp-6 Tablet, R-0           STOP taking these medications       LORazepam (Ativan) 0.5 mg tablet Comments:   Reason for Stopping:         promethazine-dextromethorphan (PROMETHAZINE-DM) 6.25-15 mg/5 mL syrup Comments:   Reason for Stopping:                 DISCONTINUED MEDICATIONS:  Discharge Medication List as of 1/9/2023  6:40 PM        STOP taking these medications       LORazepam (Ativan) 0.5 mg tablet Comments:   Reason for Stopping:         promethazine-dextromethorphan (PROMETHAZINE-DM) 6.25-15 mg/5 mL syrup Comments:   Reason for Stopping:               I am the Primary Clinician of Record. Ramila Woodward NP (electronically signed)    (Please note that parts of this dictation were completed with voice recognition software. Quite often unanticipated grammatical, syntax, homophones, and other interpretive errors are inadvertently transcribed by the computer software. Please disregards these errors.  Please excuse any errors that have escaped final proofreading.)

## 2023-03-29 ENCOUNTER — HOSPITAL ENCOUNTER (EMERGENCY)
Age: 58
Discharge: HOME | End: 2023-03-29
Attending: EMERGENCY MEDICINE
Payer: MEDICAID

## 2023-03-29 VITALS
HEIGHT: 68 IN | HEART RATE: 83 BPM | SYSTOLIC BLOOD PRESSURE: 132 MMHG | BODY MASS INDEX: 33.8 KG/M2 | DIASTOLIC BLOOD PRESSURE: 88 MMHG | RESPIRATION RATE: 16 BRPM | OXYGEN SATURATION: 98 % | TEMPERATURE: 98.5 F | WEIGHT: 223 LBS

## 2023-03-29 DIAGNOSIS — J01.90 ACUTE SINUSITIS, RECURRENCE NOT SPECIFIED, UNSPECIFIED LOCATION: ICD-10-CM

## 2023-03-29 DIAGNOSIS — J02.9 ACUTE PHARYNGITIS, UNSPECIFIED ETIOLOGY: Primary | ICD-10-CM

## 2023-03-29 PROCEDURE — 99283 EMERGENCY DEPT VISIT LOW MDM: CPT

## 2023-03-29 RX ORDER — IBUPROFEN 800 MG/1
800 TABLET ORAL
Qty: 20 TABLET | Refills: 0 | Status: SHIPPED | OUTPATIENT
Start: 2023-03-29 | End: 2023-04-05

## 2023-03-29 RX ORDER — AZELASTINE 1 MG/ML
1 SPRAY, METERED NASAL 2 TIMES DAILY
Qty: 1 EACH | Refills: 0 | Status: SHIPPED | OUTPATIENT
Start: 2023-03-29

## 2023-03-29 RX ORDER — AMOXICILLIN 875 MG/1
875 TABLET, FILM COATED ORAL 2 TIMES DAILY
Qty: 20 TABLET | Refills: 0 | Status: SHIPPED | OUTPATIENT
Start: 2023-03-29 | End: 2023-04-08

## 2023-03-29 NOTE — ED PROVIDER NOTES
[facility] EMERGENCY DEPT  EMERGENCY DEPARTMENT ENCOUNTER       Pt Name: Linda Hussein  MRN: [id number]  [place] 1965  Date of evaluation: 3/29/2023  Provider: Laith Albert PA-C   PCP: Unknown, Provider, MD  Note Started: 1:49 PM 3/29/23     ED attending involment: I have seen and evaluated the patient. My supervision physician was available for consultation. [address] [place] [address]       Chief Complaint   Patient presents with    Sore Throat    Ear Pain    Wrist Pain        HISTORY OF PRESENT ILLNESS: 1 or more elements      History From: Patient  HPI Limitations : None     Linda Hussein is a 60 y.o. female who presents sore throat, ear pain and facial pressure x1 month. Patient states with her history of HIV she has been taking Bactrim and nystatin suspension but it has not improved her symptoms. She denies any fevers but does report hot sweats and chills. She has not had any nausea, vomiting. Nursing Notes were all reviewed and agreed with or any disagreements were addressed in the HPI. REVIEW OF SYSTEMS      Review of Systems     Positives and Pertinent negatives as per HPI. PAST HISTORY     Past Medical History:  Past Medical History:   Diagnosis Date    HIV (human immunodeficiency virus infection) ([facility] [address]        Past Surgical History:  History reviewed. No pertinent surgical history. Family History:  History reviewed. No pertinent family history. Social History:  Social History     Tobacco Use    Smoking status: Every Day    Smokeless tobacco: Never   Substance Use Topics    Alcohol use: Not Currently    Drug use: Never       Allergies:  No Known Allergies    CURRENT MEDICATIONS      Discharge Medication List as of 3/29/2023  1:33 PM        CONTINUE these medications which have NOT CHANGED    Details   diazePAM (Valium) 5 mg tablet Take 1 Tablet by mouth every twelve (12) hours as needed for Anxiety or Muscle Spasm(s) (spasm).  Max Daily Amount: 10 mg., Normal, Disp-6 Tablet, R-0      mupirocin (BACTROBAN) 2 % ointment Apply  to affected area daily. , Normal, Disp-22 g, R-0      ascorbic acid/multivit-min (EMERGEN-C PO) Take  by mouth., Historical Med             PHYSICAL EXAM      ED Triage Vitals [03/29/23 1228]   ED Encounter Vitals Group      /88      Pulse (Heart Rate) 83      Resp Rate 16      Temp 98.5 °F (36.9 °C)      Temp src       O2 Sat (%) 98 %      Weight 223 lb      Height 5' 8\"        Physical Exam  Vitals and nursing note reviewed. Constitutional:       General: She is not in acute distress. Appearance: She is well-developed. HENT:      Head: Normocephalic and atraumatic. Right Ear: Tympanic membrane normal.      Left Ear: Tympanic membrane normal.      Nose: Congestion present. Right Sinus: Maxillary sinus tenderness present. Left Sinus: Maxillary sinus tenderness present. Mouth/Throat:      Mouth: Mucous membranes are moist.      Pharynx: Posterior oropharyngeal erythema present. No oropharyngeal exudate. Tonsils: No tonsillar exudate. Eyes:      Conjunctiva/sclera: Conjunctivae normal.   Cardiovascular:      Rate and Rhythm: Normal rate and regular rhythm. Heart sounds: Normal heart sounds. Pulmonary:      Effort: Pulmonary effort is normal. No respiratory distress. Breath sounds: Normal breath sounds. No wheezing, rhonchi or rales. Skin:     General: Skin is warm and dry. Neurological:      Mental Status: She is alert and oriented to person, place, and time. Psychiatric:         Mood and Affect: Mood normal.         Behavior: Behavior normal.         Thought Content: Thought content normal.         Judgment: Judgment normal.        DIAGNOSTIC RESULTS   LABS:     No results found for this or any previous visit (from the past 12 hour(s)).          PROCEDURES   Unless otherwise noted below, none  Procedures     EMERGENCY DEPARTMENT COURSE and DIFFERENTIAL DIAGNOSIS/MDM   Vitals:    Vitals:    03/29/23 1228 BP: 132/88   Pulse: 83   Resp: 16   Temp: 98.5 °F (36.9 °C)   SpO2: 98%   Weight: 101.2 kg (223 lb)   Height: 5' 8\" (1.727 m)        Patient was given the following medications:  Medications - No data to display    CONSULTS: (Who and What was discussed)  None    Chronic Conditions: HIV    Social Determinants affecting Dx or Tx: None    Records Reviewed (source and summary): Prior medical records    MDM (CC/HPI Summary, DDx, ED Course, Reassessment, Disposition Considerations -Tests not done, Shared Decision Making, Pt Expectation of Test or Tx.):  Patient presents to the ED with sore throat, facial pain and ear pain x1 month. Patient states she has been taking Bactrim and nystatin with no changes. She denies any fevers but does report chills and sweats. She has not had any nausea or vomiting. DDx: Strep versus viral pharyngitis, sinusitis, URI, tension headache, migraine, OM, cerumen impaction. On exam patient does have some mild posterior pharynx erythema and mild sinus tenderness of the maxillary region bilaterally. Since symptoms have been ongoing x1 month we will plan to treat patient with amoxicillin, azelastine spray and ibuprofen for pharyngitis and sinusitis. FINAL IMPRESSION     1. Acute pharyngitis, unspecified etiology    2. Acute sinusitis, recurrence not specified, unspecified location          DISPOSITION/PLAN   Discharged        Care plan outlined and precautions discussed. Patient has no new complaints, changes, or physical findings. All medications were reviewed with the patient; will d/c home with amoxicillin, azelastine nasal spray and ibuprofen all of pt's questions and concerns were addressed. Patient was instructed and agrees to follow up with PCP, as well as to return to the ED upon further deterioration. Patient is ready to go home.            PATIENT REFERRED TO:  Follow-up Information       Follow up With Specialties Details Why [address] [facility], [address] [place] [id number] [facility]  [phone]              DISCHARGE MEDICATIONS:  Discharge Medication List as of 3/29/2023  1:33 PM        START taking these medications    Details   amoxicillin (AMOXIL) 875 mg tablet Take 1 Tablet by mouth two (2) times a day for 10 days. , Normal, Disp-20 Tablet, R-0      ibuprofen (MOTRIN) 800 mg tablet Take 1 Tablet by mouth every six (6) hours as needed for Pain for up to 7 days. , Normal, Disp-20 Tablet, R-0      azelastine (ASTELIN) 137 mcg (0.1 %) nasal spray 1 [place] by Both Nostrils route two (2) times a day. Use in each nostril as directed, Normal, Disp-1 Each, R-0           CONTINUE these medications which have NOT CHANGED    Details   diazePAM (Valium) 5 mg tablet Take 1 Tablet by mouth every twelve (12) hours as needed for Anxiety or Muscle Spasm(s) (spasm). Max Daily Amount: 10 mg., Normal, Disp-6 Tablet, R-0      mupirocin (BACTROBAN) 2 % ointment Apply  to affected area daily. , Normal, Disp-22 g, R-0      ascorbic acid/multivit-min (EMERGEN-C PO) Take  by mouth., Historical Med               DISCONTINUED MEDICATIONS:  Discharge Medication List as of 3/29/2023  1:33 PM          I am the Primary Clinician of Record. Ap Orozco PA-C (electronically signed)    (Please note that parts of this dictation were completed with voice recognition software. Quite often unanticipated grammatical, syntax, homophones, and other interpretive errors are inadvertently transcribed by the computer software. Please disregards these errors.  Please excuse any errors that have escaped final proofreading.)

## 2023-03-29 NOTE — ED NOTES
Pt presents to ED complaining of sore throat and bilateral ear pain and right wrist pain. Pt is alert and oriented x 4, RR even and unlabored, skin is warm and dry. Assessment completed and pt updated on plan of care. Call bell in reach. Emergency Department Nursing Plan of Care       The Nursing Plan of Care is developed from the Nursing assessment and Emergency Department Attending provider initial evaluation. The plan of care may be reviewed in the ED Provider note.     The Plan of Care was developed with the following considerations:   Patient / Family readiness to learn indicated by:verbalized understanding  Persons(s) to be included in education: patient  Barriers to Learning/Limitations:No    Signed     Franco Gerber RN    3/29/2023

## 2023-03-29 NOTE — ED TRIAGE NOTES
Reports sore throat, bilateral ear pain/'tightness\". Right wrist pain (has cyst-like formation present).

## 2023-03-29 NOTE — ED NOTES
Discharge instructions were given to the patient by Angel Merlin, RN  . The patient left the Emergency Department ambulatory, alert and oriented and in no acute distress with 3 prescriptions. The patient was encouraged to call or return to the ED for worsening issues or problems and was encouraged to schedule a follow up appointment for continuing care. The patient verbalized understanding of discharge instructions and prescriptions, all questions were answered. The patient has no further concerns at this time.

## 2023-04-28 ENCOUNTER — HOSPITAL ENCOUNTER (EMERGENCY)
Age: 58
Discharge: HOME | End: 2023-04-28
Attending: EMERGENCY MEDICINE
Payer: MEDICAID

## 2023-04-28 ENCOUNTER — APPOINTMENT (OUTPATIENT)
Dept: GENERAL RADIOLOGY | Age: 58
End: 2023-04-28
Attending: NURSE PRACTITIONER
Payer: MEDICAID

## 2023-04-28 VITALS
SYSTOLIC BLOOD PRESSURE: 131 MMHG | WEIGHT: 221 LBS | OXYGEN SATURATION: 99 % | HEIGHT: 68 IN | TEMPERATURE: 98.1 F | BODY MASS INDEX: 33.49 KG/M2 | HEART RATE: 77 BPM | RESPIRATION RATE: 18 BRPM | DIASTOLIC BLOOD PRESSURE: 84 MMHG

## 2023-04-28 DIAGNOSIS — M19.071 ARTHRITIS OF RIGHT FOOT: Primary | ICD-10-CM

## 2023-04-28 PROCEDURE — 99283 EMERGENCY DEPT VISIT LOW MDM: CPT

## 2023-04-28 PROCEDURE — 73630 X-RAY EXAM OF FOOT: CPT

## 2023-04-28 RX ORDER — KETOROLAC TROMETHAMINE 30 MG/ML
30 INJECTION, SOLUTION INTRAMUSCULAR; INTRAVENOUS
Status: DISCONTINUED | OUTPATIENT
Start: 2023-04-28 | End: 2023-04-28 | Stop reason: HOSPADM

## 2023-04-28 RX ORDER — DEXTROMETHORPHAN HYDROBROMIDE, GUAIFENESIN 5; 100 MG/5ML; MG/5ML
650 LIQUID ORAL EVERY 8 HOURS
Qty: 2 TABLET | Refills: 0 | Status: SHIPPED | OUTPATIENT
Start: 2023-04-28

## 2023-04-28 RX ORDER — DICLOFENAC SODIUM 75 MG/1
75 TABLET, DELAYED RELEASE ORAL 2 TIMES DAILY
Qty: 30 TABLET | Refills: 0 | Status: SHIPPED | OUTPATIENT
Start: 2023-04-28

## 2023-04-28 NOTE — ED PROVIDER NOTES
[facility] EMERGENCY DEPT  EMERGENCY DEPARTMENT ENCOUNTER       Pt Name: Lilliana Gomez  MRN: [id number]  [place] 1965  Date of evaluation: 4/28/2023  Provider: Brian Clemens NP   PCP: Unknown, Provider, MD  Note Started: 6:51 PM 4/28/23     CHIEF COMPLAINT       Chief Complaint   Patient presents with    Foot Pain        HISTORY OF PRESENT ILLNESS: 1 or more elements      History From: Patient  HPI Limitations : None     Lilliana Gomez is a 60 y.o. female who presents ambulatory to the emergency department. Patient states that she came to the emergency department from work because she could no longer stand due to the pain in both her feet. Patient states she works 2 jobs. Patient states that she has seen a podiatrist and has a second appointment but the pain is worsening. States she has a bony deformity on the dorsal aspect of her right foot. She denies injury. Nursing Notes were all reviewed and agreed with or any disagreements were addressed in the HPI. REVIEW OF SYSTEMS      Review of Systems   Constitutional:  Negative for fatigue and fever. Respiratory:  Negative for shortness of breath and wheezing. Cardiovascular:  Negative for chest pain. Gastrointestinal:  Negative for abdominal pain. Musculoskeletal:  Negative for arthralgias, myalgias, neck pain and neck stiffness. Foot pain   Skin:  Negative for pallor and rash. Neurological:  Negative for dizziness, tremors and headaches. All other systems reviewed and are negative. Positives and Pertinent negatives as per HPI. PAST HISTORY     Past Medical History:  Past Medical History:   Diagnosis Date    HIV (human immunodeficiency virus infection) ([facility] [address]        Past Surgical History:  History reviewed. No pertinent surgical history. Family History:  History reviewed. No pertinent family history.     Social History:  Social History     Tobacco Use    Smoking status: Every Day    Smokeless tobacco: Never Substance Use Topics    Alcohol use: Not Currently    Drug use: Never       Allergies:  No Known Allergies    CURRENT MEDICATIONS      Discharge Medication List as of 4/28/2023  4:54 PM        CONTINUE these medications which have NOT CHANGED    Details   azelastine (ASTELIN) 137 mcg (0.1 %) nasal spray 1 [place] by Both Nostrils route two (2) times a day. Use in each nostril as directed, Normal, Disp-1 Each, R-0      diazePAM (Valium) 5 mg tablet Take 1 Tablet by mouth every twelve (12) hours as needed for Anxiety or Muscle Spasm(s) (spasm). Max Daily Amount: 10 mg., Normal, Disp-6 Tablet, R-0      mupirocin (BACTROBAN) 2 % ointment Apply  to affected area daily. , Normal, Disp-22 g, R-0      ascorbic acid/multivit-min (EMERGEN-C PO) Take  by mouth., Historical Med             PHYSICAL EXAM      ED Triage Vitals [04/28/23 1509]   ED Encounter Vitals Group      /84      Pulse (Heart Rate) 77      Resp Rate 18      Temp 98.1 °F (36.7 °C)      Temp src       O2 Sat (%) 99 %      Weight 221 lb      Height 5' 8\"        Physical Exam  Vitals and nursing note reviewed. Constitutional:       General: She is not in acute distress. Appearance: Normal appearance. She is well-developed. HENT:      Head: Normocephalic and atraumatic. Right Ear: External ear normal.      Left Ear: External ear normal.      Nose: Nose normal.      Mouth/Throat:      Mouth: Mucous membranes are moist.   Eyes:      Conjunctiva/sclera: Conjunctivae normal.   Cardiovascular:      Rate and Rhythm: Normal rate and regular rhythm. Heart sounds: Normal heart sounds. Pulmonary:      Effort: Pulmonary effort is normal. No respiratory distress. Breath sounds: Normal breath sounds. No wheezing. Abdominal:      General: Bowel sounds are normal.      Palpations: Abdomen is soft. Tenderness: There is no abdominal tenderness. Musculoskeletal:         General: Normal range of motion.       Cervical back: Normal range of motion and neck supple. Feet:    Lymphadenopathy:      Cervical: No cervical adenopathy. Skin:     General: Skin is warm and dry. Findings: No rash. Neurological:      Mental Status: She is alert and oriented to person, place, and time. Cranial Nerves: No cranial nerve deficit. Coordination: Coordination normal.   Psychiatric:         Behavior: Behavior normal.         Thought Content: Thought content normal.         Judgment: Judgment normal.        DIAGNOSTIC RESULTS   LABS:     No results found for this or any previous visit (from the past 12 hour(s)). EKG: When ordered, EKG's are interpreted by the Emergency Department Physician in the absence of a cardiologist.  Please see their note for interpretation of EKG. RADIOLOGY:  Non-plain film images such as CT, Ultrasound and MRI are read by the radiologist. Plain radiographic images are visualized and preliminarily interpreted by the ED Provider with the below findings:          Interpretation per the Radiologist below, if available at the time of this note:     XR FOOT RT MIN 3 V    Result Date: 4/28/2023  EXAM: XR FOOT RT MIN 3 V INDICATION: pain deformity dorsal aspect. COMPARISON: None. FINDINGS: Three views of the right foot. The bones are osteopenic. 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 There are plantar and Achilles calcaneal enthesophytes. No acute fracture or dislocation. No acute abnormality.        PROCEDURES   Unless otherwise noted below, none  Procedures     CRITICAL CARE TIME     EMERGENCY DEPARTMENT COURSE and DIFFERENTIAL DIAGNOSIS/MDM   Vitals:    Vitals:    04/28/23 1509   BP: 131/84   Pulse: 77   Resp: 18   Temp: 98.1 °F (36.7 °C)   SpO2: 99%   Weight: 100.2 kg (221 lb)   Height: 5' 8\" (1.727 m)        Patient was given the following medications:  Medications   ketorolac (TORADOL) injection 30 mg (30 mg IntraMUSCular Refused 4/28/23 1531)       CONSULTS: (Who and What was discussed)  None    Chronic Conditions: HIV    Social Determinants affecting Dx or Tx: None    Records Reviewed (source and summary of external records): Nursing notes    CC/HPI Summary, DDx, ED Course, and Reassessment: 30-year-old female complains of bilateral foot pain which has been ongoing has been evaluated by podiatry as a second appointment states pain is worse. Reports she works 2 jobs, stands on her feet all day. Pain in the right foot is worse with the bony deformity on the dorsal aspect on exam DNV is intact . tenderness to palpate plantar aspect of the right foot superficial abrasion noted beneath great toe I will order an x-ray differential diagnosis Planter fasciitis bone spurs fracture sprain    ED Course as of 04/28/23 1851   Fri Apr 28, 2023   1651  4/28/2023     Narrative & Impression  EXAM: XR FOOT RT MIN 3 V     INDICATION: pain deformity dorsal aspect. COMPARISON: None. FINDINGS: Three views of the right foot. The bones are osteopenic. There is mild  talonavicular osteoarthritis. There are plantar and Achilles calcaneal  enthesophytes. No acute fracture or dislocation.      [AN]      ED Course User Index  [AN] Dudley Galarza NP       Disposition Considerations (Tests not done, Shared Decision Making, Pt Expectation of Test or Tx.):      FINAL IMPRESSION     1. Arthritis of right foot          DISPOSITION/PLAN   Discharged    Discharge Note: The patient is stable for discharge home. The signs, symptoms, diagnosis, and discharge instructions have been discussed, understanding conveyed, and agreed upon. The patient is to follow up as recommended or return to ER should their symptoms worsen.       PATIENT REFERRED TO:  Follow-up Information       Follow up With Specialties Details Why Contact Info    Maulik Mohr DPM Podiatry In 1 week  [address] [phone]                DISCHARGE MEDICATIONS:  Discharge Medication List as of 4/28/2023  4:54 PM        START taking these medications    Details   acetaminophen (Tylenol Arthritis Pain) 650 mg TbER Take 1 Tablet by mouth every eight (8) hours. , Normal, Disp-2 Tablet, R-0      diclofenac EC (VOLTAREN) 75 mg EC tablet Take 1 Tablet by mouth two (2) times a day., Normal, Disp-30 Tablet, R-0           CONTINUE these medications which have NOT CHANGED    Details   azelastine (ASTELIN) 137 mcg (0.1 %) nasal spray 1 [place] by Both Nostrils route two (2) times a day. Use in each nostril as directed, Normal, Disp-1 Each, R-0      diazePAM (Valium) 5 mg tablet Take 1 Tablet by mouth every twelve (12) hours as needed for Anxiety or Muscle Spasm(s) (spasm). Max Daily Amount: 10 mg., Normal, Disp-6 Tablet, R-0      mupirocin (BACTROBAN) 2 % ointment Apply  to affected area daily. , Normal, Disp-22 g, R-0      ascorbic acid/multivit-min (EMERGEN-C PO) Take  by mouth., Historical Med               DISCONTINUED MEDICATIONS:  Discharge Medication List as of 4/28/2023  4:54 PM          ED Attending Involvement : I have seen and evaluated the patient. My supervision physician was available for consultation. I am the Primary Clinician of Record. Essence Herrera NP (electronically signed)    (Please note that parts of this dictation were completed with voice recognition software. Quite often unanticipated grammatical, syntax, homophones, and other interpretive errors are inadvertently transcribed by the computer software. Please disregards these errors.  Please excuse any errors that have escaped final proofreading.)

## 2023-04-28 NOTE — ED TRIAGE NOTES
Bilateral foot pain x1 month. No known injury. Had shooting pain today while at work. Has seen podiatry once and has follow up on 5/3. Has been told she maybe has gout.

## 2023-04-28 NOTE — DISCHARGE INSTRUCTIONS
Not seen     Study Result    Narrative & Impression   EXAM: XR FOOT RT MIN 3 V     INDICATION: pain deformity dorsal aspect. COMPARISON: None. FINDINGS: Three views of the right foot. The bones are osteopenic. There is mild  talonavicular osteoarthritis. There are plantar and Achilles calcaneal  enthesophytes. No acute fracture or dislocation.      IMPRESSION  No acute abnormality

## 2023-04-28 NOTE — ED NOTES
See Nursing Assessment. Emergency Department Nursing Plan of Care       The Nursing Plan of Care is developed from the Nursing assessment and Emergency Department Attending provider initial evaluation. The plan of care may be reviewed in the ED Provider note.     The Plan of Care was developed with the following considerations:   Patient / Family readiness to learn indicated by:verbalized understanding  Persons(s) to be included in education: patient  Barriers to Learning/Limitations:No    [id number] [facility] JERRICA Joseph    4/28/2023   4:08 PM

## 2023-05-12 ENCOUNTER — HOSPITAL ENCOUNTER (EMERGENCY)
Facility: HOSPITAL | Age: 58
Discharge: HOME | End: 2023-05-12
Payer: MEDICAID

## 2023-05-12 VITALS
BODY MASS INDEX: 33.8 KG/M2 | SYSTOLIC BLOOD PRESSURE: 135 MMHG | OXYGEN SATURATION: 97 % | TEMPERATURE: 98.5 F | RESPIRATION RATE: 20 BRPM | WEIGHT: 223 LBS | HEART RATE: 96 BPM | DIASTOLIC BLOOD PRESSURE: 77 MMHG | HEIGHT: 68 IN

## 2023-05-12 DIAGNOSIS — S90.811A ABRASION OF RIGHT FOOT, INITIAL ENCOUNTER: ICD-10-CM

## 2023-05-12 DIAGNOSIS — M79.671 ACUTE FOOT PAIN, RIGHT: Primary | ICD-10-CM

## 2023-05-12 PROCEDURE — 99283 EMERGENCY DEPT VISIT LOW MDM: CPT

## 2023-05-12 RX ORDER — IBUPROFEN 800 MG/1
800 TABLET ORAL EVERY 8 HOURS PRN
Qty: 20 TABLET | Refills: 3 | Status: SHIPPED | OUTPATIENT
Start: 2023-05-12

## 2023-05-12 RX ORDER — CEPHALEXIN 500 MG/1
500 CAPSULE ORAL 4 TIMES DAILY
Qty: 28 CAPSULE | Refills: 0 | Status: SHIPPED | OUTPATIENT
Start: 2023-05-12 | End: 2023-05-19

## 2023-05-12 ASSESSMENT — PAIN SCALES - GENERAL: PAINLEVEL_OUTOF10: 8

## 2023-05-12 ASSESSMENT — PAIN DESCRIPTION - DESCRIPTORS: DESCRIPTORS: ACHING;SORE

## 2023-05-12 ASSESSMENT — VISUAL ACUITY: OU: 1

## 2023-05-12 ASSESSMENT — PAIN - FUNCTIONAL ASSESSMENT: PAIN_FUNCTIONAL_ASSESSMENT: 0-10

## 2023-05-12 ASSESSMENT — PAIN DESCRIPTION - PAIN TYPE: TYPE: CHRONIC PAIN

## 2023-05-12 ASSESSMENT — PAIN DESCRIPTION - ONSET: ONSET: ON-GOING

## 2023-05-12 ASSESSMENT — PAIN DESCRIPTION - ORIENTATION: ORIENTATION: RIGHT;LEFT

## 2023-05-12 ASSESSMENT — PAIN DESCRIPTION - LOCATION: LOCATION: FOOT

## 2023-05-12 ASSESSMENT — LIFESTYLE VARIABLES: HOW MANY STANDARD DRINKS CONTAINING ALCOHOL DO YOU HAVE ON A TYPICAL DAY: PATIENT DOES NOT DRINK

## 2023-05-12 NOTE — ED NOTES
Patient (s)  given copy of dc instructions and 2 script(s). Patient (s)  verbalized understanding of instructions and script (s). Patient given a current medication reconciliation form and verbalized understanding of their medications. Patient (s) verbalized understanding of the importance of discussing medications with his or her physician or clinic they will be following up with. Patient alert and oriented and in no acute distress. Patient discharged home ambulatory with a steady gait unassisted.       Ban Hernandez RN  05/12/23 6540

## 2023-05-12 NOTE — ED PROVIDER NOTES
[facility] EMERGENCY DEPT  EMERGENCY DEPARTMENT ENCOUNTER       Pt Name: Charlette Cabrales  MRN: [id number]  [place] 1965  Date of evaluation: 5/12/2023  Provider: ANAID Lawrence   PCP: None None  Note Started: 3:13 PM 5/12/23     CHIEF COMPLAINT       Chief Complaint   Patient presents with    Foot Pain        HISTORY OF PRESENT ILLNESS: 1 or more elements      History From: Patient  None     Charlette Cabrales is a 60 y.o. female who presents ambulatory with several weeks or longer of 8 out of 10 constant, achy right foot pain that is worse with weightbearing. Patient tells me she was seen in this facility on April 28, 2023 and had x-rays of her right foot which did show a osteopenia and talonavicular osteoarthritis. She tells me she did go to the podiatrist, [facility] and [place] and saw Belle Cuevas NP. She was placed in a boot, however the patient tells me that her job with [id number] will not permit wearing a boot during work. Patient denies any new injury. She has been well lately out fever. Nursing Notes were all reviewed and agreed with or any disagreements were addressed in the HPI. REVIEW OF SYSTEMS      Review of Systems   Constitutional:  Negative for fever. Musculoskeletal:         Right foot pain      Positives and Pertinent negatives as per HPI. PAST HISTORY     Past Medical History:  Past Medical History:   Diagnosis Date    HIV (human immunodeficiency virus infection) ([facility] [address]        Past Surgical History:  History reviewed. No pertinent surgical history. Family History:  History reviewed. No pertinent family history.     Social History:  Social History     Tobacco Use    Smoking status: Every Day    Smokeless tobacco: Never   Substance Use Topics    Alcohol use: Not Currently    Drug use: Never       Allergies:  No Known Allergies    CURRENT MEDICATIONS      Previous Medications    No medications on file       PHYSICAL EXAM      ED Triage Vitals

## 2023-05-16 RX ORDER — DIAZEPAM 5 MG/1
5 TABLET ORAL
COMMUNITY
Start: 2023-01-09

## 2023-05-16 RX ORDER — DICLOFENAC SODIUM 75 MG/1
75 TABLET, DELAYED RELEASE ORAL 2 TIMES DAILY
COMMUNITY
Start: 2023-04-28

## 2023-05-16 RX ORDER — SENNOSIDES 8.6 MG
650 CAPSULE ORAL EVERY 8 HOURS
COMMUNITY
Start: 2023-04-28

## 2023-05-16 RX ORDER — AZELASTINE 1 MG/ML
1 SPRAY, METERED NASAL 2 TIMES DAILY
COMMUNITY
Start: 2023-03-29

## 2023-12-01 ENCOUNTER — APPOINTMENT (OUTPATIENT)
Facility: HOSPITAL | Age: 58
End: 2023-12-01
Payer: MEDICAID

## 2023-12-01 ENCOUNTER — HOSPITAL ENCOUNTER (INPATIENT)
Facility: HOSPITAL | Age: 58
LOS: 1 days | Discharge: LEFT BEFORE DISCHARGE | End: 2023-12-02
Attending: EMERGENCY MEDICINE | Admitting: FAMILY MEDICINE
Payer: MEDICAID

## 2023-12-01 VITALS
TEMPERATURE: 98.2 F | DIASTOLIC BLOOD PRESSURE: 96 MMHG | BODY MASS INDEX: 35.42 KG/M2 | HEIGHT: 68 IN | OXYGEN SATURATION: 98 % | RESPIRATION RATE: 18 BRPM | WEIGHT: 233.69 LBS | HEART RATE: 76 BPM | SYSTOLIC BLOOD PRESSURE: 157 MMHG

## 2023-12-01 DIAGNOSIS — L02.619 FOOT ABSCESS: Primary | ICD-10-CM

## 2023-12-01 PROBLEM — L08.9 FOOT INFECTION: Status: ACTIVE | Noted: 2023-12-01

## 2023-12-01 LAB
ALBUMIN SERPL-MCNC: 3.5 G/DL (ref 3.5–5)
ALBUMIN/GLOB SERPL: 0.7 (ref 1.1–2.2)
ALP SERPL-CCNC: 86 U/L (ref 45–117)
ALT SERPL-CCNC: 23 U/L (ref 12–78)
ANION GAP SERPL CALC-SCNC: 1 MMOL/L (ref 5–15)
AST SERPL-CCNC: ABNORMAL U/L (ref 15–37)
BASOPHILS # BLD: 0 K/UL (ref 0–0.1)
BASOPHILS NFR BLD: 1 % (ref 0–1)
BILIRUB SERPL-MCNC: 0.4 MG/DL (ref 0.2–1)
BUN SERPL-MCNC: 12 MG/DL (ref 6–20)
BUN/CREAT SERPL: 17 (ref 12–20)
CALCIUM SERPL-MCNC: 9 MG/DL (ref 8.5–10.1)
CHLORIDE SERPL-SCNC: 105 MMOL/L (ref 97–108)
CO2 SERPL-SCNC: 30 MMOL/L (ref 21–32)
COMMENT:: NORMAL
COMMENT:: NORMAL
CREAT SERPL-MCNC: 0.7 MG/DL (ref 0.55–1.02)
CRP SERPL-MCNC: <0.29 MG/DL (ref 0–0.6)
DIFFERENTIAL METHOD BLD: ABNORMAL
EOSINOPHIL # BLD: 0.1 K/UL (ref 0–0.4)
EOSINOPHIL NFR BLD: 2 % (ref 0–7)
ERYTHROCYTE [DISTWIDTH] IN BLOOD BY AUTOMATED COUNT: 15.4 % (ref 11.5–14.5)
ERYTHROCYTE [SEDIMENTATION RATE] IN BLOOD: 33 MM/HR (ref 0–30)
GLOBULIN SER CALC-MCNC: 5.3 G/DL (ref 2–4)
GLUCOSE SERPL-MCNC: 80 MG/DL (ref 65–100)
HCT VFR BLD AUTO: 36.7 % (ref 35–47)
HGB BLD-MCNC: 11.4 G/DL (ref 11.5–16)
IMM GRANULOCYTES # BLD AUTO: 0 K/UL (ref 0–0.04)
IMM GRANULOCYTES NFR BLD AUTO: 0 % (ref 0–0.5)
LACTATE SERPL-SCNC: 1.4 MMOL/L (ref 0.4–2)
LYMPHOCYTES # BLD: 1.6 K/UL (ref 0.8–3.5)
LYMPHOCYTES NFR BLD: 25 % (ref 12–49)
MAGNESIUM SERPL-MCNC: 1.9 MG/DL (ref 1.6–2.4)
MAGNESIUM SERPL-MCNC: NORMAL MG/DL (ref 1.6–2.4)
MCH RBC QN AUTO: 22.7 PG (ref 26–34)
MCHC RBC AUTO-ENTMCNC: 31.1 G/DL (ref 30–36.5)
MCV RBC AUTO: 73.1 FL (ref 80–99)
MONOCYTES # BLD: 0.4 K/UL (ref 0–1)
MONOCYTES NFR BLD: 6 % (ref 5–13)
NEUTS SEG # BLD: 4.3 K/UL (ref 1.8–8)
NEUTS SEG NFR BLD: 66 % (ref 32–75)
NRBC # BLD: 0 K/UL (ref 0–0.01)
NRBC BLD-RTO: 0 PER 100 WBC
PLATELET # BLD AUTO: 390 K/UL (ref 150–400)
PMV BLD AUTO: 8.7 FL (ref 8.9–12.9)
POTASSIUM SERPL-SCNC: 4.6 MMOL/L (ref 3.5–5.1)
POTASSIUM SERPL-SCNC: ABNORMAL MMOL/L (ref 3.5–5.1)
PROT SERPL-MCNC: 8.8 G/DL (ref 6.4–8.2)
RBC # BLD AUTO: 5.02 M/UL (ref 3.8–5.2)
SODIUM SERPL-SCNC: 136 MMOL/L (ref 136–145)
SPECIMEN HOLD: NORMAL
SPECIMEN HOLD: NORMAL
WBC # BLD AUTO: 6.4 K/UL (ref 3.6–11)

## 2023-12-01 PROCEDURE — 85652 RBC SED RATE AUTOMATED: CPT

## 2023-12-01 PROCEDURE — 80053 COMPREHEN METABOLIC PANEL: CPT

## 2023-12-01 PROCEDURE — 2580000003 HC RX 258: Performed by: FAMILY MEDICINE

## 2023-12-01 PROCEDURE — 36415 COLL VENOUS BLD VENIPUNCTURE: CPT

## 2023-12-01 PROCEDURE — 73630 X-RAY EXAM OF FOOT: CPT

## 2023-12-01 PROCEDURE — 87070 CULTURE OTHR SPECIMN AEROBIC: CPT

## 2023-12-01 PROCEDURE — 87040 BLOOD CULTURE FOR BACTERIA: CPT

## 2023-12-01 PROCEDURE — 6360000002 HC RX W HCPCS: Performed by: EMERGENCY MEDICINE

## 2023-12-01 PROCEDURE — 85025 COMPLETE CBC W/AUTO DIFF WBC: CPT

## 2023-12-01 PROCEDURE — 99285 EMERGENCY DEPT VISIT HI MDM: CPT

## 2023-12-01 PROCEDURE — 83735 ASSAY OF MAGNESIUM: CPT

## 2023-12-01 PROCEDURE — 84132 ASSAY OF SERUM POTASSIUM: CPT

## 2023-12-01 PROCEDURE — 87205 SMEAR GRAM STAIN: CPT

## 2023-12-01 PROCEDURE — 1200000000 HC SEMI PRIVATE

## 2023-12-01 PROCEDURE — 6360000002 HC RX W HCPCS: Performed by: FAMILY MEDICINE

## 2023-12-01 PROCEDURE — 86140 C-REACTIVE PROTEIN: CPT

## 2023-12-01 PROCEDURE — 83605 ASSAY OF LACTIC ACID: CPT

## 2023-12-01 PROCEDURE — 87102 FUNGUS ISOLATION CULTURE: CPT

## 2023-12-01 RX ORDER — SODIUM CHLORIDE 9 MG/ML
INJECTION, SOLUTION INTRAVENOUS PRN
Status: DISCONTINUED | OUTPATIENT
Start: 2023-12-01 | End: 2023-12-02 | Stop reason: HOSPADM

## 2023-12-01 RX ORDER — POLYETHYLENE GLYCOL 3350 17 G/17G
17 POWDER, FOR SOLUTION ORAL DAILY PRN
Status: DISCONTINUED | OUTPATIENT
Start: 2023-12-01 | End: 2023-12-02 | Stop reason: HOSPADM

## 2023-12-01 RX ORDER — POTASSIUM CHLORIDE 7.45 MG/ML
10 INJECTION INTRAVENOUS PRN
Status: DISCONTINUED | OUTPATIENT
Start: 2023-12-01 | End: 2023-12-02 | Stop reason: HOSPADM

## 2023-12-01 RX ORDER — SODIUM CHLORIDE 0.9 % (FLUSH) 0.9 %
5-40 SYRINGE (ML) INJECTION EVERY 12 HOURS SCHEDULED
Status: DISCONTINUED | OUTPATIENT
Start: 2023-12-01 | End: 2023-12-02 | Stop reason: HOSPADM

## 2023-12-01 RX ORDER — SODIUM CHLORIDE, SODIUM LACTATE, POTASSIUM CHLORIDE, CALCIUM CHLORIDE 600; 310; 30; 20 MG/100ML; MG/100ML; MG/100ML; MG/100ML
INJECTION, SOLUTION INTRAVENOUS CONTINUOUS
Status: DISCONTINUED | OUTPATIENT
Start: 2023-12-01 | End: 2023-12-02 | Stop reason: HOSPADM

## 2023-12-01 RX ORDER — ACETAMINOPHEN 325 MG/1
650 TABLET ORAL EVERY 6 HOURS PRN
Status: DISCONTINUED | OUTPATIENT
Start: 2023-12-01 | End: 2023-12-02 | Stop reason: HOSPADM

## 2023-12-01 RX ORDER — ACETAMINOPHEN 650 MG/1
650 SUPPOSITORY RECTAL EVERY 6 HOURS PRN
Status: DISCONTINUED | OUTPATIENT
Start: 2023-12-01 | End: 2023-12-02 | Stop reason: HOSPADM

## 2023-12-01 RX ORDER — SODIUM CHLORIDE 0.9 % (FLUSH) 0.9 %
5-40 SYRINGE (ML) INJECTION PRN
Status: DISCONTINUED | OUTPATIENT
Start: 2023-12-01 | End: 2023-12-02 | Stop reason: HOSPADM

## 2023-12-01 RX ORDER — ONDANSETRON 2 MG/ML
4 INJECTION INTRAMUSCULAR; INTRAVENOUS EVERY 6 HOURS PRN
Status: DISCONTINUED | OUTPATIENT
Start: 2023-12-01 | End: 2023-12-02 | Stop reason: HOSPADM

## 2023-12-01 RX ORDER — ONDANSETRON 4 MG/1
4 TABLET, ORALLY DISINTEGRATING ORAL EVERY 8 HOURS PRN
Status: DISCONTINUED | OUTPATIENT
Start: 2023-12-01 | End: 2023-12-02 | Stop reason: HOSPADM

## 2023-12-01 RX ORDER — MAGNESIUM SULFATE IN WATER 40 MG/ML
2000 INJECTION, SOLUTION INTRAVENOUS PRN
Status: DISCONTINUED | OUTPATIENT
Start: 2023-12-01 | End: 2023-12-02 | Stop reason: HOSPADM

## 2023-12-01 RX ORDER — POTASSIUM CHLORIDE 750 MG/1
40 TABLET, FILM COATED, EXTENDED RELEASE ORAL PRN
Status: DISCONTINUED | OUTPATIENT
Start: 2023-12-01 | End: 2023-12-02 | Stop reason: HOSPADM

## 2023-12-01 RX ADMIN — SODIUM CHLORIDE, PRESERVATIVE FREE 10 ML: 5 INJECTION INTRAVENOUS at 11:42

## 2023-12-01 RX ADMIN — Medication 2500 MG: at 11:44

## 2023-12-01 RX ADMIN — SODIUM CHLORIDE, POTASSIUM CHLORIDE, SODIUM LACTATE AND CALCIUM CHLORIDE: 600; 310; 30; 20 INJECTION, SOLUTION INTRAVENOUS at 11:51

## 2023-12-01 RX ADMIN — PIPERACILLIN AND TAZOBACTAM 3375 MG: 3; .375 INJECTION, POWDER, LYOPHILIZED, FOR SOLUTION INTRAVENOUS at 22:07

## 2023-12-01 ASSESSMENT — PAIN DESCRIPTION - LOCATION: LOCATION: FOOT

## 2023-12-01 ASSESSMENT — PAIN - FUNCTIONAL ASSESSMENT: PAIN_FUNCTIONAL_ASSESSMENT: 0-10

## 2023-12-01 ASSESSMENT — PAIN DESCRIPTION - ONSET: ONSET: ON-GOING

## 2023-12-01 ASSESSMENT — PAIN SCALES - GENERAL: PAINLEVEL_OUTOF10: 10

## 2023-12-01 ASSESSMENT — PAIN DESCRIPTION - DESCRIPTORS: DESCRIPTORS: DISCOMFORT

## 2023-12-01 ASSESSMENT — PAIN DESCRIPTION - PAIN TYPE: TYPE: CHRONIC PAIN

## 2023-12-01 ASSESSMENT — PAIN DESCRIPTION - FREQUENCY: FREQUENCY: CONTINUOUS

## 2023-12-01 ASSESSMENT — PAIN DESCRIPTION - ORIENTATION: ORIENTATION: RIGHT

## 2023-12-01 NOTE — H&P
History and Physical    Date of Service:  12/1/2023  Primary Care Provider: None, None  Source of information: The patient and Chart review    Chief Complaint: Wound Infection      History of Presenting Illness:   Refugio Kaur is a 60 y.o. female who was sent to the ER by her podiatrist due to ongoing right foot infection. Upon presentation, patient with stable vital signs, lab works were unremarkable, initial right foot x-ray negative for acute pathology, MRI of the right foot pending. Patient was started on vancomycin and Zosyn in the ER and hospitalist service requested admit the patient for further evaluation management. The patient denies any headache, blurry vision, sore throat, trouble swallowing, trouble with speech, chest pain, SOB, cough, fever, chills, N/V/D, abd pain, urinary symptoms, constipation, recent travels, sick contacts, focal or generalized neurological symptoms, falls, injuries, rashes, contact with COVID-19 diagnosed patients, hematemesis, melena, hemoptysis, hematuria, rashes, denies starting any new medications and denies any other concerns or problems besides as mentioned above. REVIEW OF SYSTEMS:  A comprehensive review of systems was negative except for that written in the History of Present Illness. Past Medical History:   Diagnosis Date    HIV (human immunodeficiency virus infection) ([address])       No past surgical history on file. Prior to Admission medications    Medication Sig Start Date End Date Taking? Authorizing Provider   acetaminophen (TYLENOL) 650 MG extended release tablet Take 1 tablet by mouth in the morning and 1 tablet at noon and 1 tablet in the evening. 4/28/23   Automatic Reconciliation, Ar   azelastine (ASTELIN) 0.1 % nasal spray 1 spray by Nasal route 2 times daily 3/29/23   Automatic Reconciliation, Ar   diazePAM (VALIUM) 5 MG tablet Take 1 tablet by mouth.  1/9/23   Automatic Reconciliation, Ar   diclofenac (VOLTAREN) 75 MG EC

## 2023-12-01 NOTE — ED PROVIDER NOTES
[address] [facility] ([facility]) ENCOUNTER      Patient Name: Jermaine Berman  MRN: [id number]  [address] [facility] 1965  Date of Evaluation: 12/1/2023  Physician: Rony Queen MD    CHIEF COMPLAINT       Chief Complaint   Patient presents with    Wound Infection       HISTORY OF PRESENT ILLNESS   (Location/Symptom, Timing/Onset, Context/Setting, Quality, Duration, Modifying Factors, Severity)   Jermaine Berman, 60 y.o., female     62-year-old female with HIV presents with chief complaint of a right foot wound. Patient was referred by her podiatrist for admission. Nursing Notes were reviewed. REVIEW OF SYSTEMS    (Not required)   Review of Systems    PAST MEDICAL HISTORY     Past Medical History:   Diagnosis Date    HIV (human immunodeficiency virus infection) ([address] [facility])        SURGICAL HISTORY     No past surgical history on file. CURRENT MEDICATIONS       Current Discharge Medication List        CONTINUE these medications which have NOT CHANGED    Details   acetaminophen (TYLENOL) 650 MG extended release tablet Take 1 tablet by mouth in the morning and 1 tablet at noon and 1 tablet in the evening. azelastine (ASTELIN) 0.1 % nasal spray 1 spray by Nasal route 2 times daily      diazePAM (VALIUM) 5 MG tablet Take 1 tablet by mouth. diclofenac (VOLTAREN) 75 MG EC tablet Take 1 tablet by mouth 2 times daily      mupirocin (BACTROBAN) 2 % ointment Apply topically daily      ibuprofen (ADVIL;MOTRIN) 800 MG tablet Take 1 tablet by mouth every 8 hours as needed for Pain  Qty: 20 tablet, Refills: 3             ALLERGIES     Patient has no known allergies. FAMILY HISTORY     No family history on file.      SOCIAL HISTORY       Social History     Socioeconomic History    Marital status: Single   Tobacco Use    Smoking status: Every Day    Smokeless tobacco: Never   Substance and Sexual Activity    Alcohol use: Not Currently    Drug use: Never       PHYSICAL EXAM     Vitals:

## 2023-12-01 NOTE — ED NOTES
This RN called MRI after patient completed MRI screening form to see if they were ready for pt. They are not ready for pt at this time. RN to send patient to IP room.      Tariq Ridley RN  12/01/23 [id number]

## 2023-12-01 NOTE — ED NOTES
Attempted etransfer- immediately placed on hold and will not answer phone.       Latonya Bach RN  12/01/23 [id number]

## 2023-12-01 NOTE — ED TRIAGE NOTES
Pt comes to ED from home as a referral from her podiatrist for an ongoing right foot infection. Dr Catarino Melendez assessing pt in triage.

## 2023-12-02 ENCOUNTER — HOSPITAL ENCOUNTER (EMERGENCY)
Facility: HOSPITAL | Age: 58
Discharge: HOME | End: 2023-12-02
Attending: STUDENT IN AN ORGANIZED HEALTH CARE EDUCATION/TRAINING PROGRAM
Payer: MEDICAID

## 2023-12-02 ENCOUNTER — APPOINTMENT (OUTPATIENT)
Facility: HOSPITAL | Age: 58
End: 2023-12-02
Payer: MEDICAID

## 2023-12-02 VITALS
OXYGEN SATURATION: 97 % | RESPIRATION RATE: 18 BRPM | HEIGHT: 68 IN | WEIGHT: 233.69 LBS | HEART RATE: 85 BPM | TEMPERATURE: 98.3 F | BODY MASS INDEX: 35.42 KG/M2 | DIASTOLIC BLOOD PRESSURE: 97 MMHG | SYSTOLIC BLOOD PRESSURE: 154 MMHG

## 2023-12-02 DIAGNOSIS — L03.115 CELLULITIS OF RIGHT FOOT: Primary | ICD-10-CM

## 2023-12-02 LAB
ALBUMIN SERPL-MCNC: 3.5 G/DL (ref 3.5–5)
ALBUMIN/GLOB SERPL: 0.7 (ref 1.1–2.2)
ALP SERPL-CCNC: 80 U/L (ref 45–117)
ALT SERPL-CCNC: 18 U/L (ref 12–78)
ANION GAP SERPL CALC-SCNC: 2 MMOL/L (ref 5–15)
AST SERPL-CCNC: 16 U/L (ref 15–37)
BACTERIA SPEC CULT: NORMAL
BASOPHILS # BLD: 0 K/UL (ref 0–0.1)
BASOPHILS NFR BLD: 0 % (ref 0–1)
BILIRUB SERPL-MCNC: 0.1 MG/DL (ref 0.2–1)
BUN SERPL-MCNC: 12 MG/DL (ref 6–20)
BUN/CREAT SERPL: 19 (ref 12–20)
CALCIUM SERPL-MCNC: 9 MG/DL (ref 8.5–10.1)
CHLORIDE SERPL-SCNC: 108 MMOL/L (ref 97–108)
CO2 SERPL-SCNC: 27 MMOL/L (ref 21–32)
COMMENT:: NORMAL
CREAT SERPL-MCNC: 0.63 MG/DL (ref 0.55–1.02)
CRP SERPL-MCNC: <0.29 MG/DL (ref 0–0.6)
DIFFERENTIAL METHOD BLD: ABNORMAL
EOSINOPHIL # BLD: 0.1 K/UL (ref 0–0.4)
EOSINOPHIL NFR BLD: 2 % (ref 0–7)
ERYTHROCYTE [DISTWIDTH] IN BLOOD BY AUTOMATED COUNT: 15.7 % (ref 11.5–14.5)
ERYTHROCYTE [SEDIMENTATION RATE] IN BLOOD: 33 MM/HR (ref 0–30)
GLOBULIN SER CALC-MCNC: 4.9 G/DL (ref 2–4)
GLUCOSE SERPL-MCNC: 90 MG/DL (ref 65–100)
GRAM STN SPEC: NORMAL
GRAM STN SPEC: NORMAL
HCT VFR BLD AUTO: 35.3 % (ref 35–47)
HGB BLD-MCNC: 11.1 G/DL (ref 11.5–16)
IMM GRANULOCYTES # BLD AUTO: 0 K/UL (ref 0–0.04)
IMM GRANULOCYTES NFR BLD AUTO: 0 % (ref 0–0.5)
LYMPHOCYTES # BLD: 1.7 K/UL (ref 0.8–3.5)
LYMPHOCYTES NFR BLD: 32 % (ref 12–49)
MCH RBC QN AUTO: 22.8 PG (ref 26–34)
MCHC RBC AUTO-ENTMCNC: 31.4 G/DL (ref 30–36.5)
MCV RBC AUTO: 72.5 FL (ref 80–99)
MONOCYTES # BLD: 0.4 K/UL (ref 0–1)
MONOCYTES NFR BLD: 8 % (ref 5–13)
NEUTS SEG # BLD: 3 K/UL (ref 1.8–8)
NEUTS SEG NFR BLD: 58 % (ref 32–75)
NRBC # BLD: 0 K/UL (ref 0–0.01)
NRBC BLD-RTO: 0 PER 100 WBC
PLATELET # BLD AUTO: 364 K/UL (ref 150–400)
PMV BLD AUTO: 9.3 FL (ref 8.9–12.9)
POTASSIUM SERPL-SCNC: 4.6 MMOL/L (ref 3.5–5.1)
PROT SERPL-MCNC: 8.4 G/DL (ref 6.4–8.2)
RBC # BLD AUTO: 4.87 M/UL (ref 3.8–5.2)
SERVICE CMNT-IMP: NORMAL
SODIUM SERPL-SCNC: 137 MMOL/L (ref 136–145)
SPECIMEN HOLD: NORMAL
WBC # BLD AUTO: 5.2 K/UL (ref 3.6–11)

## 2023-12-02 PROCEDURE — A9579 GAD-BASE MR CONTRAST NOS,1ML: HCPCS | Performed by: STUDENT IN AN ORGANIZED HEALTH CARE EDUCATION/TRAINING PROGRAM

## 2023-12-02 PROCEDURE — 85025 COMPLETE CBC W/AUTO DIFF WBC: CPT

## 2023-12-02 PROCEDURE — 6360000004 HC RX CONTRAST MEDICATION: Performed by: STUDENT IN AN ORGANIZED HEALTH CARE EDUCATION/TRAINING PROGRAM

## 2023-12-02 PROCEDURE — 96374 THER/PROPH/DIAG INJ IV PUSH: CPT

## 2023-12-02 PROCEDURE — 2580000003 HC RX 258: Performed by: STUDENT IN AN ORGANIZED HEALTH CARE EDUCATION/TRAINING PROGRAM

## 2023-12-02 PROCEDURE — 86140 C-REACTIVE PROTEIN: CPT

## 2023-12-02 PROCEDURE — 80053 COMPREHEN METABOLIC PANEL: CPT

## 2023-12-02 PROCEDURE — 99285 EMERGENCY DEPT VISIT HI MDM: CPT

## 2023-12-02 PROCEDURE — 73720 MRI LWR EXTREMITY W/O&W/DYE: CPT

## 2023-12-02 PROCEDURE — 85652 RBC SED RATE AUTOMATED: CPT

## 2023-12-02 PROCEDURE — 36415 COLL VENOUS BLD VENIPUNCTURE: CPT

## 2023-12-02 RX ORDER — AMOXICILLIN AND CLAVULANATE POTASSIUM 875; 125 MG/1; MG/1
1 TABLET, FILM COATED ORAL 2 TIMES DAILY
Qty: 20 TABLET | Refills: 0 | Status: SHIPPED | OUTPATIENT
Start: 2023-12-02 | End: 2023-12-12

## 2023-12-02 RX ORDER — SODIUM CHLORIDE 0.9 % (FLUSH) 0.9 %
10 SYRINGE (ML) INJECTION ONCE
Status: COMPLETED | OUTPATIENT
Start: 2023-12-02 | End: 2023-12-02

## 2023-12-02 RX ADMIN — GADOTERIDOL 20 ML: 279.3 INJECTION, SOLUTION INTRAVENOUS at 16:44

## 2023-12-02 RX ADMIN — SODIUM CHLORIDE, PRESERVATIVE FREE 10 ML: 5 INJECTION INTRAVENOUS at 16:44

## 2023-12-02 ASSESSMENT — PAIN DESCRIPTION - LOCATION
LOCATION: FOOT
LOCATION: FOOT

## 2023-12-02 ASSESSMENT — PAIN DESCRIPTION - DESCRIPTORS
DESCRIPTORS: ACHING
DESCRIPTORS: ACHING

## 2023-12-02 ASSESSMENT — PAIN DESCRIPTION - FREQUENCY
FREQUENCY: INTERMITTENT
FREQUENCY: INTERMITTENT

## 2023-12-02 ASSESSMENT — PAIN DESCRIPTION - ONSET
ONSET: PROGRESSIVE
ONSET: PROGRESSIVE

## 2023-12-02 ASSESSMENT — PAIN DESCRIPTION - ORIENTATION
ORIENTATION: RIGHT
ORIENTATION: RIGHT

## 2023-12-02 ASSESSMENT — PAIN SCALES - GENERAL
PAINLEVEL_OUTOF10: 5
PAINLEVEL_OUTOF10: 3

## 2023-12-02 ASSESSMENT — ENCOUNTER SYMPTOMS: SHORTNESS OF BREATH: 0

## 2023-12-02 ASSESSMENT — PAIN - FUNCTIONAL ASSESSMENT
PAIN_FUNCTIONAL_ASSESSMENT: ACTIVITIES ARE NOT PREVENTED
PAIN_FUNCTIONAL_ASSESSMENT: 0-10

## 2023-12-02 ASSESSMENT — PAIN DESCRIPTION - PAIN TYPE
TYPE: ACUTE PAIN
TYPE: ACUTE PAIN

## 2023-12-02 NOTE — PROGRESS NOTES
[facility]:    Notified by RN of patient requesting to leave AMA. Patient was in the hallway on my arrival, stating three doctors have told her different things and she might come back for an MRI tomorrow. She was asked if she would like to discuss her care, offered her explanation, and she refused the conversation. Informed her of her treatment including podiatry and hospitalist care, IV antibiotics, MRI, and possible need for surgery. She was informed of the risk of leaving to include sepsis, worsening infection, amputation, and death. She verbalizes understanding of the risks and is A/O x4. Patient refused to sign AMA form.     Kentrell BLAND

## 2023-12-02 NOTE — PROGRESS NOTES
The pt is AXOX4, and wants to leave AMA. The pt states that she was confused that different doctors told her different treatment, and she said she need MRI done today, but could done due to some reason, and she feels uncomfortable to stay in the hospital, and would come back to ED tomorrow. Night shif SCOTT March was on site and explained all the risk of leaving hospital right now, the pt understood well and still wanted to leave AMA,  the pt also refuse to sign the AMA form.

## 2023-12-02 NOTE — ED TRIAGE NOTES
Pt comes to ED from home with reports of right foot infection. States she was told she needs an MRI. Yasmeen Delacruz assessing pt in triage.

## 2023-12-02 NOTE — DISCHARGE SUMMARY
Discharge Summary       PATIENT ID: Kimi You  MRN: [id number]   YOB: 1965    DATE OF ADMISSION: 12/1/2023 10:20 AM    DATE OF DISCHARGE: 12/2/23   PRIMARY CARE PROVIDER: None, None     ATTENDING PHYSICIAN: Dmitry Rincon  DISCHARGING PROVIDER: Dmitry Rincon MD    To contact this individual call [phone] and ask the  to page. If unavailable ask to be transferred the Adult Hospitalist Department. CONSULTATIONS: IP CONSULT TO PODIATRY  IP CONSULT TO PODIATRY  IP CONSULT TO PHARMACY    PROCEDURES/SURGERIES: * No surgery found *    ADMITTING [address] [facility] COURSE:     Kimi You is a 60 y.o. female who was sent to the ER by her podiatrist due to ongoing right foot infection. Upon presentation, patient with stable vital signs, lab works were unremarkable, initial right foot x-ray negative for acute pathology, MRI of the right foot pending. Patient was started on vancomycin and Zosyn in the ER and hospitalist service requested admit the patient for further evaluation management.                DISCHARGE DIAGNOSES / PLAN:       Pt was not seen by me, completing d/c summary as per hospital requirement  She signed out AMA       PENDING TEST RESULTS:   At the time of discharge the following test results are still pending:     FOLLOW UP APPOINTMENTS:    [unfilled]     ADDITIONAL CARE RECOMMENDATIONS:     DIET:   Oral Nutritional Supplements:     ACTIVITY:     WOUND CARE:     EQUIPMENT needed:       DISCHARGE MEDICATIONS:     Medication List        ASK your doctor about these medications      acetaminophen 650 MG extended release tablet  Commonly known as: TYLENOL     azelastine 0.1 % nasal spray  Commonly known as: ASTELIN     diazePAM 5 MG tablet  Commonly known as: VALIUM     diclofenac 75 MG EC tablet  Commonly known as: VOLTAREN     ibuprofen 800 MG tablet  Commonly known as: ADVIL;MOTRIN  Take 1 tablet by mouth every 8 hours as needed for Pain

## 2023-12-03 LAB
BACTERIA SPEC CULT: NORMAL
BACTERIA SPEC CULT: NORMAL
SERVICE CMNT-IMP: NORMAL
SERVICE CMNT-IMP: NORMAL

## 2023-12-03 NOTE — DISCHARGE INSTRUCTIONS
Take antibiotics as prescribed. Schedule an appointment to be seen by Dr. Sofía Ritter. If you develop fever, chills, worsening pain or other new concerning symptoms return to ER.

## 2023-12-04 LAB
BACTERIA SPEC CULT: NORMAL
SERVICE CMNT-IMP: NORMAL

## 2023-12-06 LAB
BACTERIA SPEC CULT: NORMAL
GRAM STN SPEC: NORMAL
GRAM STN SPEC: NORMAL
SERVICE CMNT-IMP: NORMAL

## 2023-12-11 LAB
BACTERIA SPEC CULT: NORMAL
SERVICE CMNT-IMP: NORMAL

## 2023-12-25 LAB
BACTERIA SPEC CULT: NORMAL
SERVICE CMNT-IMP: NORMAL

## 2024-01-01 LAB
BACTERIA SPEC CULT: NORMAL
SERVICE CMNT-IMP: NORMAL

## 2024-04-22 ENCOUNTER — APPOINTMENT (OUTPATIENT)
Facility: HOSPITAL | Age: 59
End: 2024-04-22
Payer: MEDICAID

## 2024-04-22 ENCOUNTER — HOSPITAL ENCOUNTER (EMERGENCY)
Facility: HOSPITAL | Age: 59
Discharge: ANOTHER HOSPITAL | End: 2024-04-22
Attending: EMERGENCY MEDICINE
Payer: MEDICAID

## 2024-04-22 ENCOUNTER — HOSPITAL ENCOUNTER (EMERGENCY)
Facility: HOSPITAL | Age: 59
Discharge: HOME | End: 2024-04-22
Attending: EMERGENCY MEDICINE
Payer: MEDICAID

## 2024-04-22 VITALS
DIASTOLIC BLOOD PRESSURE: 85 MMHG | TEMPERATURE: 98.4 F | OXYGEN SATURATION: 97 % | SYSTOLIC BLOOD PRESSURE: 116 MMHG | WEIGHT: 237.5 LBS | HEIGHT: 69 IN | RESPIRATION RATE: 18 BRPM | HEART RATE: 80 BPM | BODY MASS INDEX: 35.18 KG/M2

## 2024-04-22 VITALS
BODY MASS INDEX: 36.11 KG/M2 | DIASTOLIC BLOOD PRESSURE: 98 MMHG | SYSTOLIC BLOOD PRESSURE: 151 MMHG | RESPIRATION RATE: 18 BRPM | WEIGHT: 240.96 LBS | TEMPERATURE: 98.6 F | HEART RATE: 72 BPM | OXYGEN SATURATION: 97 %

## 2024-04-22 DIAGNOSIS — H70.93 MASTOIDITIS OF BOTH SIDES: ICD-10-CM

## 2024-04-22 DIAGNOSIS — R51.9 ACUTE NONINTRACTABLE HEADACHE, UNSPECIFIED HEADACHE TYPE: ICD-10-CM

## 2024-04-22 DIAGNOSIS — R53.1 GENERALIZED WEAKNESS: ICD-10-CM

## 2024-04-22 DIAGNOSIS — R07.9 CHEST PAIN, UNSPECIFIED TYPE: ICD-10-CM

## 2024-04-22 DIAGNOSIS — H65.193 ACUTE MEE (MIDDLE EAR EFFUSION), BILATERAL: Primary | ICD-10-CM

## 2024-04-22 DIAGNOSIS — R20.2 PARESTHESIAS IN LEFT HAND: Primary | ICD-10-CM

## 2024-04-22 LAB
ALBUMIN SERPL-MCNC: 3.4 G/DL (ref 3.5–5)
ALBUMIN/GLOB SERPL: 0.7 (ref 1.1–2.2)
ALP SERPL-CCNC: 84 U/L (ref 45–117)
ALT SERPL-CCNC: 28 U/L (ref 12–78)
ANION GAP SERPL CALC-SCNC: 8 MMOL/L (ref 5–15)
AST SERPL-CCNC: 21 U/L (ref 15–37)
BASOPHILS # BLD: 0 K/UL (ref 0–0.1)
BASOPHILS NFR BLD: 0 % (ref 0–1)
BILIRUB SERPL-MCNC: 0.2 MG/DL (ref 0.2–1)
BUN SERPL-MCNC: 10 MG/DL (ref 6–20)
BUN/CREAT SERPL: 14 (ref 12–20)
CALCIUM SERPL-MCNC: 9 MG/DL (ref 8.5–10.1)
CHLORIDE SERPL-SCNC: 101 MMOL/L (ref 97–108)
CO2 SERPL-SCNC: 28 MMOL/L (ref 21–32)
CREAT SERPL-MCNC: 0.74 MG/DL (ref 0.55–1.02)
D DIMER PPP FEU-MCNC: 0.37 MG/L FEU (ref 0–0.65)
DIFFERENTIAL METHOD BLD: ABNORMAL
EOSINOPHIL # BLD: 0.1 K/UL (ref 0–0.4)
EOSINOPHIL NFR BLD: 2 % (ref 0–7)
ERYTHROCYTE [DISTWIDTH] IN BLOOD BY AUTOMATED COUNT: 16.4 % (ref 11.5–14.5)
GLOBULIN SER CALC-MCNC: 4.8 G/DL (ref 2–4)
GLUCOSE SERPL-MCNC: 99 MG/DL (ref 65–100)
HCT VFR BLD AUTO: 39.3 % (ref 35–47)
HGB BLD-MCNC: 12.4 G/DL (ref 11.5–16)
IMM GRANULOCYTES # BLD AUTO: 0 K/UL (ref 0–0.04)
IMM GRANULOCYTES NFR BLD AUTO: 0 % (ref 0–0.5)
LYMPHOCYTES # BLD: 1.7 K/UL (ref 0.8–3.5)
LYMPHOCYTES NFR BLD: 29 % (ref 12–49)
MCH RBC QN AUTO: 22.9 PG (ref 26–34)
MCHC RBC AUTO-ENTMCNC: 31.6 G/DL (ref 30–36.5)
MCV RBC AUTO: 72.5 FL (ref 80–99)
MONOCYTES # BLD: 0.6 K/UL (ref 0–1)
MONOCYTES NFR BLD: 10 % (ref 5–13)
NEUTS SEG # BLD: 3.3 K/UL (ref 1.8–8)
NEUTS SEG NFR BLD: 59 % (ref 32–75)
NRBC # BLD: 0 K/UL (ref 0–0.01)
NRBC BLD-RTO: 0 PER 100 WBC
PLATELET # BLD AUTO: 351 K/UL (ref 150–400)
PMV BLD AUTO: 8.7 FL (ref 8.9–12.9)
POTASSIUM SERPL-SCNC: 4.4 MMOL/L (ref 3.5–5.1)
PROT SERPL-MCNC: 8.2 G/DL (ref 6.4–8.2)
RBC # BLD AUTO: 5.42 M/UL (ref 3.8–5.2)
SODIUM SERPL-SCNC: 137 MMOL/L (ref 136–145)
TROPONIN I SERPL HS-MCNC: <4 NG/L (ref 0–51)
WBC # BLD AUTO: 5.6 K/UL (ref 3.6–11)

## 2024-04-22 PROCEDURE — 99285 EMERGENCY DEPT VISIT HI MDM: CPT

## 2024-04-22 PROCEDURE — 6360000002 HC RX W HCPCS: Performed by: EMERGENCY MEDICINE

## 2024-04-22 PROCEDURE — 84484 ASSAY OF TROPONIN QUANT: CPT

## 2024-04-22 PROCEDURE — 74174 CTA ABD&PLVS W/CONTRAST: CPT

## 2024-04-22 PROCEDURE — 6360000004 HC RX CONTRAST MEDICATION: Performed by: EMERGENCY MEDICINE

## 2024-04-22 PROCEDURE — 71045 X-RAY EXAM CHEST 1 VIEW: CPT

## 2024-04-22 PROCEDURE — 94761 N-INVAS EAR/PLS OXIMETRY MLT: CPT

## 2024-04-22 PROCEDURE — 93005 ELECTROCARDIOGRAM TRACING: CPT | Performed by: EMERGENCY MEDICINE

## 2024-04-22 PROCEDURE — 96374 THER/PROPH/DIAG INJ IV PUSH: CPT

## 2024-04-22 PROCEDURE — 85379 FIBRIN DEGRADATION QUANT: CPT

## 2024-04-22 PROCEDURE — 85025 COMPLETE CBC W/AUTO DIFF WBC: CPT

## 2024-04-22 PROCEDURE — 36415 COLL VENOUS BLD VENIPUNCTURE: CPT

## 2024-04-22 PROCEDURE — 99284 EMERGENCY DEPT VISIT MOD MDM: CPT

## 2024-04-22 PROCEDURE — 70450 CT HEAD/BRAIN W/O DYE: CPT

## 2024-04-22 PROCEDURE — 80053 COMPREHEN METABOLIC PANEL: CPT

## 2024-04-22 PROCEDURE — 96375 TX/PRO/DX INJ NEW DRUG ADDON: CPT

## 2024-04-22 RX ORDER — DEXAMETHASONE 2 MG/1
TABLET ORAL
Qty: 24 TABLET | Refills: 0 | Status: SHIPPED | OUTPATIENT
Start: 2024-04-22 | End: 2024-04-29

## 2024-04-22 RX ORDER — LEVOFLOXACIN 500 MG/1
500 TABLET, FILM COATED ORAL DAILY
Qty: 10 TABLET | Refills: 0 | Status: SHIPPED | OUTPATIENT
Start: 2024-04-22 | End: 2024-05-02

## 2024-04-22 RX ORDER — FENTANYL CITRATE 50 UG/ML
50 INJECTION, SOLUTION INTRAMUSCULAR; INTRAVENOUS
Status: DISCONTINUED | OUTPATIENT
Start: 2024-04-22 | End: 2024-04-22 | Stop reason: HOSPADM

## 2024-04-22 RX ORDER — DEXAMETHASONE SODIUM PHOSPHATE 10 MG/ML
10 INJECTION, SOLUTION INTRAMUSCULAR; INTRAVENOUS ONCE
Status: COMPLETED | OUTPATIENT
Start: 2024-04-22 | End: 2024-04-22

## 2024-04-22 RX ORDER — BUTALBITAL, ACETAMINOPHEN AND CAFFEINE 50; 325; 40 MG/1; MG/1; MG/1
1 TABLET ORAL EVERY 6 HOURS PRN
Qty: 20 TABLET | Refills: 0 | Status: SHIPPED | OUTPATIENT
Start: 2024-04-22

## 2024-04-22 RX ORDER — KETOROLAC TROMETHAMINE 30 MG/ML
15 INJECTION, SOLUTION INTRAMUSCULAR; INTRAVENOUS
Status: COMPLETED | OUTPATIENT
Start: 2024-04-22 | End: 2024-04-22

## 2024-04-22 RX ADMIN — IOPAMIDOL 100 ML: 755 INJECTION, SOLUTION INTRAVENOUS at 15:15

## 2024-04-22 RX ADMIN — DEXAMETHASONE SODIUM PHOSPHATE 10 MG: 10 INJECTION, SOLUTION INTRAMUSCULAR; INTRAVENOUS at 17:07

## 2024-04-22 RX ADMIN — KETOROLAC TROMETHAMINE 15 MG: 30 INJECTION, SOLUTION INTRAMUSCULAR at 14:42

## 2024-04-22 ASSESSMENT — PAIN - FUNCTIONAL ASSESSMENT
PAIN_FUNCTIONAL_ASSESSMENT: ACTIVITIES ARE NOT PREVENTED
PAIN_FUNCTIONAL_ASSESSMENT: 0-10

## 2024-04-22 ASSESSMENT — PAIN DESCRIPTION - LOCATION
LOCATION: CHEST
LOCATION: ARM

## 2024-04-22 ASSESSMENT — PAIN SCALES - GENERAL
PAINLEVEL_OUTOF10: 7
PAINLEVEL_OUTOF10: 8

## 2024-04-22 ASSESSMENT — LIFESTYLE VARIABLES
HOW OFTEN DO YOU HAVE A DRINK CONTAINING ALCOHOL: NEVER
HOW MANY STANDARD DRINKS CONTAINING ALCOHOL DO YOU HAVE ON A TYPICAL DAY: PATIENT DOES NOT DRINK

## 2024-04-22 ASSESSMENT — PAIN DESCRIPTION - DESCRIPTORS
DESCRIPTORS: PRESSURE
DESCRIPTORS: TINGLING

## 2024-04-22 ASSESSMENT — HEART SCORE: ECG: NORMAL

## 2024-04-22 ASSESSMENT — PAIN DESCRIPTION - ORIENTATION: ORIENTATION: RIGHT

## 2024-04-22 NOTE — ED NOTES
Zulay MONTANEZ reviewed discharge instructions with the patient.  The patient verbalized understanding.  All questions and concerns were addressed.  The patient declined a wheelchair and is discharged ambulatory in the care of family members with instructions and prescriptions in hand.  Pt is alert and oriented x 4.  Respirations are clear and unlabored.

## 2024-04-22 NOTE — ED TRIAGE NOTES
Pt reports x4 days having SOB especially with walking short distances, chest pain that radiates towards her back, dizziness, and tingling in her L hand. Pt reports PMH of HTN and HIV.

## 2024-04-22 NOTE — ED PROVIDER NOTES
MEDICATIONS      Discharge Medication List as of 2024  4:48 PM        CONTINUE these medications which have NOT CHANGED    Details   acetaminophen (TYLENOL) 650 MG extended release tablet Take 1 tablet by mouth in the morning and 1 tablet at noon and 1 tablet in the evening.Historical Med      azelastine (ASTELIN) 0.1 % nasal spray 1 spray by Nasal route 2 times dailyHistorical Med      diazePAM (VALIUM) 5 MG tablet Take 1 tablet by mouth.Historical Med      diclofenac (VOLTAREN) 75 MG EC tablet Take 1 tablet by mouth 2 times dailyHistorical Med      mupirocin (BACTROBAN) 2 % ointment Apply topically daily, Topical, DAILY Starting Wed 8/3/2022, Historical Med      ibuprofen (ADVIL;MOTRIN) 800 MG tablet Take 1 tablet by mouth every 8 hours as needed for Pain, Disp-20 tablet, R-3Normal             SCREENINGS        History: 1  EC  Patient Age: 1  Risk Factors: 1  Troponin: 0  Heart Score Total: 3        No data recorded         PHYSICAL EXAM      ED Triage Vitals   Enc Vitals Group      BP 24 1413 (!) 131/94      Pulse 24 1413 76      Respirations 24 1413 12      Temp 24 1442 98.6 °F (37 °C)      Temp Source 24 1442 Oral      SpO2 24 1413 99 %      Weight - Scale 24 1442 109.3 kg (240 lb 15.4 oz)      Height --       Head Circumference --       Peak Flow --       Pain Score --       Pain Loc --       Pain Edu? --       Excl. in GC? --         Physical Exam  Vitals and nursing note reviewed.   Constitutional:       General: She is not in acute distress.     Appearance: Normal appearance. She is obese. She is not ill-appearing.   HENT:      Head: Normocephalic and atraumatic.      Comments: No swelling of mastoid noted bilateral        Right Ear: Tympanic membrane normal.      Left Ear: Tympanic membrane normal.      Mouth/Throat:      Mouth: Mucous membranes are moist.   Eyes:      General: No scleral icterus.     Extraocular Movements: Extraocular movements intact.

## 2024-04-22 NOTE — ED PROVIDER NOTES
Use Topics    Alcohol use: Not Currently    Drug use: Never       Allergies:  No Known Allergies    CURRENT MEDICATIONS      Previous Medications    ACETAMINOPHEN (TYLENOL) 650 MG EXTENDED RELEASE TABLET    Take 1 tablet by mouth in the morning and 1 tablet at noon and 1 tablet in the evening.    AZELASTINE (ASTELIN) 0.1 % NASAL SPRAY    1 spray by Nasal route 2 times daily    DIAZEPAM (VALIUM) 5 MG TABLET    Take 1 tablet by mouth.    DICLOFENAC (VOLTAREN) 75 MG EC TABLET    Take 1 tablet by mouth 2 times daily    IBUPROFEN (ADVIL;MOTRIN) 800 MG TABLET    Take 1 tablet by mouth every 8 hours as needed for Pain    MUPIROCIN (BACTROBAN) 2 % OINTMENT    Apply topically daily       SCREENINGS        History: 1  EC  Patient Age: 1  Risk Factors: 1  Troponin: 0  Heart Score Total: 3        No data recorded         PHYSICAL EXAM      ED Triage Vitals [24 1232]   Enc Vitals Group      /81      Pulse 94      Respirations 28      Temp 98.4 °F (36.9 °C)      Temp src       SpO2 99 %      Weight - Scale 107.7 kg (237 lb 8 oz)      Height 1.74 m (5' 8.5\")      Head Circumference       Peak Flow       Pain Score       Pain Loc       Pain Edu?       Excl. in GC?         Physical Exam  Vitals and nursing note reviewed.   Constitutional:       Comments: 59-year-old female, resting in bed, appears mildly anxious, no acute distress   HENT:      Head: Normocephalic.      Nose: Nose normal.   Cardiovascular:      Rate and Rhythm: Normal rate and regular rhythm.      Heart sounds: No murmur heard.  Pulmonary:      Effort: Tachypnea present.      Breath sounds: No decreased breath sounds, wheezing, rhonchi or rales.   Abdominal:      General: Abdomen is flat.      Tenderness: There is no abdominal tenderness.   Musculoskeletal:         General: Normal range of motion.      Right lower leg: No edema.      Left lower leg: No edema.   Skin:     General: Skin is warm.   Neurological:      General: No focal deficit present.

## 2024-04-24 LAB
EKG ATRIAL RATE: 83 BPM
EKG DIAGNOSIS: NORMAL
EKG P AXIS: 42 DEGREES
EKG P-R INTERVAL: 144 MS
EKG Q-T INTERVAL: 354 MS
EKG QRS DURATION: 76 MS
EKG QTC CALCULATION (BAZETT): 415 MS
EKG R AXIS: 32 DEGREES
EKG T AXIS: 45 DEGREES
EKG VENTRICULAR RATE: 83 BPM

## 2024-04-24 PROCEDURE — 93010 ELECTROCARDIOGRAM REPORT: CPT | Performed by: SPECIALIST
